# Patient Record
Sex: MALE | Race: OTHER | HISPANIC OR LATINO | Employment: FULL TIME | URBAN - METROPOLITAN AREA
[De-identification: names, ages, dates, MRNs, and addresses within clinical notes are randomized per-mention and may not be internally consistent; named-entity substitution may affect disease eponyms.]

---

## 2018-08-14 ENCOUNTER — HOSPITAL ENCOUNTER (EMERGENCY)
Facility: HOSPITAL | Age: 23
Discharge: HOME | End: 2018-08-14
Attending: EMERGENCY MEDICINE

## 2018-08-14 VITALS
HEART RATE: 79 BPM | OXYGEN SATURATION: 96 % | RESPIRATION RATE: 16 BRPM | WEIGHT: 241.18 LBS | SYSTOLIC BLOOD PRESSURE: 125 MMHG | DIASTOLIC BLOOD PRESSURE: 66 MMHG | TEMPERATURE: 97.6 F

## 2018-08-14 DIAGNOSIS — R19.7 DIARRHEA: ICD-10-CM

## 2018-08-14 DIAGNOSIS — R11.0 NAUSEA: Primary | ICD-10-CM

## 2018-08-14 PROCEDURE — 99283 EMERGENCY DEPT VISIT LOW MDM: CPT

## 2018-08-14 RX ORDER — ONDANSETRON 4 MG/1
TABLET, ORALLY DISINTEGRATING ORAL
Status: COMPLETED
Start: 2018-08-14 | End: 2018-08-14

## 2018-08-14 RX ORDER — ONDANSETRON 4 MG/1
4 TABLET, ORALLY DISINTEGRATING ORAL EVERY 6 HOURS PRN
Qty: 20 TABLET | Refills: 0 | Status: SHIPPED | OUTPATIENT
Start: 2018-08-14

## 2018-08-14 RX ORDER — LOPERAMIDE HYDROCHLORIDE 2 MG/1
CAPSULE ORAL
Status: COMPLETED
Start: 2018-08-14 | End: 2018-08-14

## 2018-08-14 RX ORDER — LOPERAMIDE HYDROCHLORIDE 2 MG/1
2 CAPSULE ORAL ONCE
Status: COMPLETED | OUTPATIENT
Start: 2018-08-14 | End: 2018-08-14

## 2018-08-14 RX ORDER — ONDANSETRON 4 MG/1
4 TABLET, ORALLY DISINTEGRATING ORAL ONCE
Status: COMPLETED | OUTPATIENT
Start: 2018-08-14 | End: 2018-08-14

## 2018-08-14 RX ADMIN — ONDANSETRON 4 MG: 4 TABLET, ORALLY DISINTEGRATING ORAL at 12:33

## 2018-08-14 RX ADMIN — LOPERAMIDE HYDROCHLORIDE 2 MG: 2 CAPSULE ORAL at 12:34

## 2018-08-14 NOTE — ED PROVIDER NOTES
History  Chief Complaint   Patient presents with    Vomiting     "from yesterday I have been throwing up and diarrhea with some abdomen pain"   epigastric pain that is intermittent     56-year-old male with no past medical history presenting with nausea vomiting and diarrhea since yesterday  Patient reports eating any new restaurant and afterwards felt immediately nauseous with 1 episode of vomiting  He states that currently he feels nauseous has not vomited today but has had 2 episodes of diarrhea  He denies any dysuria, hematuria, hemoptysis hematemesis melena or hematochezia  He denies taking anything at home for symptom  He denies any fevers chills or sweats  Patient denies any decreased p o  Intake, has been eating and drinking as normal             None       History reviewed  No pertinent past medical history  Past Surgical History:   Procedure Laterality Date    FOOT SURGERY         History reviewed  No pertinent family history  I have reviewed and agree with the history as documented  Social History   Substance Use Topics    Smoking status: Current Every Day Smoker     Types: E-Cigarettes    Smokeless tobacco: Never Used    Alcohol use Not on file        Review of Systems   All other systems reviewed and are negative  Physical Exam  Physical Exam   Constitutional: He is oriented to person, place, and time  He appears well-developed and well-nourished  No distress  HENT:   Head: Normocephalic and atraumatic  Eyes: Conjunctivae are normal    EOM grossly intact   Neck: Normal range of motion  Neck supple  No JVD present  Cardiovascular: Normal rate  Pulmonary/Chest: Effort normal    Abdominal: Soft  Slight tenderness to the epigastric region with palpation, no right lower quadrant pain with palpation   Musculoskeletal:   FROM, steady gait, cap refill brisk, strength and sensation grossly intact throughout   Neurological: He is alert and oriented to person, place, and time  Skin: Skin is warm and dry  Capillary refill takes less than 2 seconds  Psychiatric: He has a normal mood and affect  His behavior is normal    Nursing note and vitals reviewed  Vital Signs  ED Triage Vitals [08/14/18 1210]   Temperature Pulse Respirations Blood Pressure SpO2   97 6 °F (36 4 °C) 79 16 125/66 96 %      Temp Source Heart Rate Source Patient Position - Orthostatic VS BP Location FiO2 (%)   Temporal Monitor Sitting Left arm --      Pain Score       --           Vitals:    08/14/18 1210   BP: 125/66   Pulse: 79   Patient Position - Orthostatic VS: Sitting       Visual Acuity      ED Medications  Medications   ondansetron (ZOFRAN-ODT) dispersible tablet 4 mg (4 mg Oral Given 8/14/18 1233)   loperamide (IMODIUM) capsule 2 mg (2 mg Oral Given 8/14/18 1234)       Diagnostic Studies  Results Reviewed     None                 No orders to display              Procedures  Procedures       Phone Contacts  ED Phone Contact    ED Course                               MDM  Number of Diagnoses or Management Options  Diarrhea:   Nausea:   Diagnosis management comments: 17-year-old male presenting with nausea vomiting diarrhea after eating at a restaurant, patient's symptoms improved the Zofran and Imodium in the ED, spoke with patient about this likely being viral due to food poisoning, will send home with Zofran for symptomatic treatment, patient to stay hydrated      strict return to ED precautions discussed  Pt verbalizes understanding and agrees with plan  Pt is stable for discharge    Portions of the record may have been created with voice recognition software  Occasional wrong word or "sound a like" substitutions may have occurred due to the inherent limitations of voice recognition software  Read the chart carefully and recognize, using context, where substitutions have occurred          CritCare Time    Disposition  Final diagnoses:   Nausea   Diarrhea     Time reflects when diagnosis was documented in both MDM as applicable and the Disposition within this note     Time User Action Codes Description Comment    8/14/2018  1:07 PM Gertrude Hidalgo Add [R11 0] Nausea     8/14/2018  1:07 PM Theo Tremaine WASHINGTON Add [R19 7] Diarrhea       ED Disposition     ED Disposition Condition Comment    Discharge  Silvestre Flores discharge to home/self care  Condition at discharge: Good        Follow-up Information     Follow up With Specialties Details Why [facility] Medicine Schedule an appointment as soon as possible for a visit in 1 day If symptoms worsen [address] [place] [address]  [address] [phone]          Discharge Medication List as of 8/14/2018  1:08 PM      START taking these medications    Details   ondansetron (ZOFRAN-ODT) 4 mg disintegrating tablet Take 1 tablet (4 mg total) by mouth every 6 (six) hours as needed for nausea or vomiting, Starting Tue 8/14/2018, Print           No discharge procedures on file      ED Provider  Electronically Signed by           Terese Gtuierrez PA-C  08/14/18 3189

## 2018-08-14 NOTE — DISCHARGE INSTRUCTIONS
Náusea y vómito severo, cuidados ambulatorios   INFORMACIÓN GENERAL:   La náusea y vómito severo  empieza de repente, empeora rápidamente y dura un corto periodo de Vickie  La náusea y el vómito pueden ser causados por el embarazo, el alcohol, david infección o medicamentos  Síntomas relacionados comunes incluyen los siguientes:   · Fiebre    · Inflamación abdominal    · Dolor, sensibilidad o un bulto en el abdomen    · Sonidos salpicantes que se escuchan en [place] estómago cuando usted se mueve  Busque cuidados inmediatos para los siguientes síntomas:   · Ashvin en [place] vómito o heces    · Dolor repentino y severo en el pecho y parte superior del abdomen después de kassandra vomitado muy lazaro    · Mareos, sequedad en la boca y sed    · Muy poca orina o ausencia completa de la misma    · Debilidad muscular, calambres musculares y dificultad para respirar    · Latidos cardíacos más rápidos de lo normal    · Vómito por más de 50 horas  El tratamiento para la náusea y el vómito severo  puede incluir medicamentos para calmar [place] estómago y parar el vómito  Es probable que usted necesite de líquidos intravenosos si está deshidratado  Maneje [place] náusea y vómito:   · [place] líquidos según indicaciones, para prevenir la deshidratación  Pregunte cuánto líquido jania [employer] jean pierre y cuáles líquidos son mejores para usted  Es probable que usted necesite jania un líquido de rehidratación oral  Ewelina líquido contiene agua, sales y azúcares que son todos necesarios para reemplazar los líquidos que el cuerpo ha perdido  Pregunte qué tipo de líquidos de rehidratación oral jania, cuánto jania y dónde conseguirlos  · Consuma comidas pequeñas con más frecuencia  Consuma cantidades pequeñas de alimentos cada 2 o 3 horas aunque no sienta hambre  Los alimentos en [place] estómago pueden llegar a [employer]  · Evite el estrés  Busque formas de relajarse y [place] estrés   Los gregorio de alonzo debidos al estrés pueden incluso causar náusea y vómito  Descanse y [employer]  Programe david cathie con love proveedor de kerry según indicaciones:  Anote brad preguntas para que las recuerde na brad citas de seguimiento  ACUERDOS SOBRE LOVE CUIDADO:   Usted tiene el derecho de participar en la planificación de love cuidado  Aprenda todo lo que pueda sobre love condición y iwona darle tratamiento  Discuta con brad médicos brad opciones de tratamiento para juntos decidir el cuidado que usted quiere recibir  Usted siempre tiene el derecho a rechazar love tratamiento  Esta información es sólo para uso en educación  Love intención no es darle un consejo médico sobre enfermedades o tratamientos  Colsulte con love Star Pieter farmacéutico antes de seguir cualquier régimen médico para saber si es seguro y efectivo para usted  © 2014 [address] is for End User's use only and may not be sold, redistributed or otherwise used for commercial purposes  All illustrations and images included in CareNotes® are the copyrighted property of A RENETTA A M , Inc  or Rob Victor

## 2018-08-15 ENCOUNTER — HOSPITAL ENCOUNTER (EMERGENCY)
Facility: HOSPITAL | Age: 23
Discharge: HOME | End: 2018-08-15
Attending: EMERGENCY MEDICINE | Admitting: EMERGENCY MEDICINE

## 2018-08-15 VITALS
OXYGEN SATURATION: 98 % | SYSTOLIC BLOOD PRESSURE: 126 MMHG | TEMPERATURE: 98 F | RESPIRATION RATE: 20 BRPM | DIASTOLIC BLOOD PRESSURE: 64 MMHG | HEART RATE: 76 BPM | WEIGHT: 240.74 LBS

## 2018-08-15 DIAGNOSIS — K52.9 GASTROENTERITIS: Primary | ICD-10-CM

## 2018-08-15 LAB
ALBUMIN SERPL BCP-MCNC: 4.4 G/DL (ref 3–5.2)
ALP SERPL-CCNC: 55 U/L (ref 43–122)
ALT SERPL W P-5'-P-CCNC: 48 U/L (ref 9–52)
ANION GAP SERPL CALCULATED.3IONS-SCNC: 11 MMOL/L (ref 5–14)
AST SERPL W P-5'-P-CCNC: 29 U/L (ref 17–59)
BASOPHILS # BLD AUTO: 0 THOUSANDS/ΜL (ref 0–0.1)
BASOPHILS NFR BLD AUTO: 1 % (ref 0–1)
BILIRUB SERPL-MCNC: 0.5 MG/DL
BILIRUB UR QL STRIP: NEGATIVE
BUN SERPL-MCNC: 8 MG/DL (ref 5–25)
CALCIUM SERPL-MCNC: 9.3 MG/DL (ref 8.4–10.2)
CHLORIDE SERPL-SCNC: 99 MMOL/L (ref 97–108)
CLARITY UR: CLEAR
CO2 SERPL-SCNC: 28 MMOL/L (ref 22–30)
COLOR UR: NORMAL
CREAT SERPL-MCNC: 0.73 MG/DL (ref 0.7–1.5)
EOSINOPHIL # BLD AUTO: 0.1 THOUSAND/ΜL (ref 0–0.4)
EOSINOPHIL NFR BLD AUTO: 2 % (ref 0–6)
ERYTHROCYTE [DISTWIDTH] IN BLOOD BY AUTOMATED COUNT: 13.3 %
GFR SERPL CREATININE-BSD FRML MDRD: 129 ML/MIN/1.73SQ M
GLUCOSE SERPL-MCNC: 83 MG/DL (ref 70–99)
GLUCOSE UR STRIP-MCNC: NEGATIVE MG/DL
HCT VFR BLD AUTO: 43.3 % (ref 41–53)
HGB BLD-MCNC: 14.8 G/DL (ref 13.5–17.5)
HGB UR QL STRIP.AUTO: NEGATIVE
KETONES UR STRIP-MCNC: NEGATIVE MG/DL
LEUKOCYTE ESTERASE UR QL STRIP: NEGATIVE
LIPASE SERPL-CCNC: 28 U/L (ref 23–300)
LYMPHOCYTES # BLD AUTO: 1.6 THOUSANDS/ΜL (ref 0.5–4)
LYMPHOCYTES NFR BLD AUTO: 35 % (ref 20–50)
MCH RBC QN AUTO: 29.1 PG (ref 26–34)
MCHC RBC AUTO-ENTMCNC: 34.1 G/DL (ref 31–36)
MCV RBC AUTO: 85 FL (ref 80–100)
MONOCYTES # BLD AUTO: 0.5 THOUSAND/ΜL (ref 0.2–0.9)
MONOCYTES NFR BLD AUTO: 10 % (ref 1–10)
NEUTROPHILS # BLD AUTO: 2.3 THOUSANDS/ΜL (ref 1.8–7.8)
NEUTS SEG NFR BLD AUTO: 52 % (ref 45–65)
NITRITE UR QL STRIP: NEGATIVE
PH UR STRIP.AUTO: 6 [PH] (ref 4.5–8)
PLATELET # BLD AUTO: 280 THOUSANDS/UL (ref 150–450)
PMV BLD AUTO: 6.7 FL (ref 8.9–12.7)
POTASSIUM SERPL-SCNC: 4 MMOL/L (ref 3.6–5)
PROT SERPL-MCNC: 7.8 G/DL (ref 5.9–8.4)
PROT UR STRIP-MCNC: NEGATIVE MG/DL
RBC # BLD AUTO: 5.09 MILLION/UL (ref 4.5–5.9)
SODIUM SERPL-SCNC: 138 MMOL/L (ref 137–147)
SP GR UR STRIP.AUTO: 1.01 (ref 1–1.04)
UROBILINOGEN UA: NEGATIVE MG/DL
WBC # BLD AUTO: 4.5 THOUSAND/UL (ref 4.5–11)

## 2018-08-15 PROCEDURE — 80053 COMPREHEN METABOLIC PANEL: CPT | Performed by: PHYSICIAN ASSISTANT

## 2018-08-15 PROCEDURE — 36415 COLL VENOUS BLD VENIPUNCTURE: CPT | Performed by: PHYSICIAN ASSISTANT

## 2018-08-15 PROCEDURE — 96374 THER/PROPH/DIAG INJ IV PUSH: CPT

## 2018-08-15 PROCEDURE — 96361 HYDRATE IV INFUSION ADD-ON: CPT

## 2018-08-15 PROCEDURE — 99283 EMERGENCY DEPT VISIT LOW MDM: CPT

## 2018-08-15 PROCEDURE — 83690 ASSAY OF LIPASE: CPT | Performed by: PHYSICIAN ASSISTANT

## 2018-08-15 PROCEDURE — 96375 TX/PRO/DX INJ NEW DRUG ADDON: CPT

## 2018-08-15 PROCEDURE — 81003 URINALYSIS AUTO W/O SCOPE: CPT | Performed by: PHYSICIAN ASSISTANT

## 2018-08-15 PROCEDURE — 85025 COMPLETE CBC W/AUTO DIFF WBC: CPT | Performed by: PHYSICIAN ASSISTANT

## 2018-08-15 RX ORDER — MAGNESIUM HYDROXIDE/ALUMINUM HYDROXICE/SIMETHICONE 120; 1200; 1200 MG/30ML; MG/30ML; MG/30ML
SUSPENSION ORAL
Status: COMPLETED
Start: 2018-08-15 | End: 2018-08-15

## 2018-08-15 RX ORDER — KETOROLAC TROMETHAMINE 30 MG/ML
INJECTION, SOLUTION INTRAMUSCULAR; INTRAVENOUS
Status: COMPLETED
Start: 2018-08-15 | End: 2018-08-15

## 2018-08-15 RX ORDER — MAGNESIUM HYDROXIDE/ALUMINUM HYDROXICE/SIMETHICONE 120; 1200; 1200 MG/30ML; MG/30ML; MG/30ML
30 SUSPENSION ORAL ONCE
Status: COMPLETED | OUTPATIENT
Start: 2018-08-15 | End: 2018-08-15

## 2018-08-15 RX ORDER — SUCRALFATE ORAL 1 G/10ML
1000 SUSPENSION ORAL ONCE
Status: COMPLETED | OUTPATIENT
Start: 2018-08-15 | End: 2018-08-15

## 2018-08-15 RX ORDER — ACETAMINOPHEN 325 MG/1
TABLET ORAL
Status: COMPLETED
Start: 2018-08-15 | End: 2018-08-15

## 2018-08-15 RX ORDER — SUCRALFATE 1 G/1
1 TABLET ORAL 4 TIMES DAILY
Qty: 40 TABLET | Refills: 0 | Status: SHIPPED | OUTPATIENT
Start: 2018-08-15

## 2018-08-15 RX ORDER — ACETAMINOPHEN 325 MG/1
650 TABLET ORAL ONCE
Status: COMPLETED | OUTPATIENT
Start: 2018-08-15 | End: 2018-08-15

## 2018-08-15 RX ORDER — OMEPRAZOLE 20 MG/1
20 CAPSULE, DELAYED RELEASE ORAL DAILY
Qty: 40 CAPSULE | Refills: 0 | Status: SHIPPED | OUTPATIENT
Start: 2018-08-15

## 2018-08-15 RX ORDER — SUCRALFATE ORAL 1 G/10ML
SUSPENSION ORAL
Status: COMPLETED
Start: 2018-08-15 | End: 2018-08-15

## 2018-08-15 RX ORDER — KETOROLAC TROMETHAMINE 30 MG/ML
30 INJECTION, SOLUTION INTRAMUSCULAR; INTRAVENOUS ONCE
Status: COMPLETED | OUTPATIENT
Start: 2018-08-15 | End: 2018-08-15

## 2018-08-15 RX ORDER — SODIUM CHLORIDE 9 MG/ML
250 INJECTION, SOLUTION INTRAVENOUS CONTINUOUS
Status: DISCONTINUED | OUTPATIENT
Start: 2018-08-15 | End: 2018-08-15 | Stop reason: HOSPADM

## 2018-08-15 RX ADMIN — ACETAMINOPHEN 650 MG: 325 TABLET ORAL at 17:18

## 2018-08-15 RX ADMIN — SUCRALFATE 1000 MG: 1 SUSPENSION ORAL at 17:18

## 2018-08-15 RX ADMIN — ALUMINUM HYDROXIDE, MAGNESIUM HYDROXIDE, AND SIMETHICONE 30 ML: 200; 200; 20 SUSPENSION ORAL at 17:18

## 2018-08-15 RX ADMIN — MAGNESIUM HYDROXIDE/ALUMINUM HYDROXICE/SIMETHICONE 30 ML: 120; 1200; 1200 SUSPENSION ORAL at 17:18

## 2018-08-15 RX ADMIN — SUCRALFATE ORAL 1000 MG: 1 SUSPENSION ORAL at 17:18

## 2018-08-15 RX ADMIN — FAMOTIDINE 20 MG: 10 INJECTION, SOLUTION INTRAVENOUS at 16:52

## 2018-08-15 RX ADMIN — SODIUM CHLORIDE 250 ML/HR: 9 INJECTION, SOLUTION INTRAVENOUS at 16:47

## 2018-08-15 RX ADMIN — KETOROLAC TROMETHAMINE 30 MG: 30 INJECTION, SOLUTION INTRAMUSCULAR; INTRAVENOUS at 17:19

## 2018-08-15 NOTE — ED NOTES
Patient was seen here yesterday for vomiting,d iarrhea  Reports vomiting and diarrhea resolved  Patient reports eating grape, crackers and drinking soda today  Reports mild epigastric pain, mild headache  Reports subjective fevers but did not take actual temperature       Molly Tineo RN  08/15/18 4087

## 2018-08-15 NOTE — ED PROVIDER NOTES
History  Chief Complaint   Patient presents with    Vomiting     pt staets taht he was here yesterday for vomiting and diarrhea and is not any better today  pt is now also having abd pain  pt also has a subjective fever and generalized body aches       Medical Problem   Location:  Pt with conitnued nausea and vomiting  Severity:  Mild  Onset quality:  Gradual  Duration:  4 days  Timing:  Constant  Progression:  Unchanged  Chronicity:  New  Associated symptoms: abdominal pain, nausea and vomiting    Associated symptoms: no chest pain, no congestion, no cough, no diarrhea, no ear pain, no fatigue, no fever, no headaches, no loss of consciousness, no myalgias, no rash, no rhinorrhea, no shortness of breath, no sore throat and no wheezing        Prior to Admission Medications   Prescriptions Last Dose Informant Patient Reported? Taking?   ondansetron (ZOFRAN-ODT) 4 mg disintegrating tablet   No No   Sig: Take 1 tablet (4 mg total) by mouth every 6 (six) hours as needed for nausea or vomiting      Facility-Administered Medications: None       History reviewed  No pertinent past medical history  Past Surgical History:   Procedure Laterality Date    FOOT SURGERY         History reviewed  No pertinent family history  I have reviewed and agree with the history as documented  Social History   Substance Use Topics    Smoking status: Current Every Day Smoker     Types: E-Cigarettes    Smokeless tobacco: Never Used    Alcohol use No        Review of Systems   Constitutional: Negative  Negative for fatigue and fever  HENT: Negative  Negative for congestion, ear pain, rhinorrhea and sore throat  Eyes: Negative  Respiratory: Negative  Negative for cough, shortness of breath and wheezing  Cardiovascular: Negative  Negative for chest pain  Gastrointestinal: Positive for abdominal pain, nausea and vomiting  Negative for diarrhea  Endocrine: Negative  Genitourinary: Negative      Musculoskeletal: Negative  Negative for myalgias  Skin: Negative  Negative for rash  Allergic/Immunologic: Negative  Neurological: Negative  Negative for loss of consciousness and headaches  Hematological: Negative  Psychiatric/Behavioral: Negative  All other systems reviewed and are negative  Physical Exam  Physical Exam   Constitutional: He appears well-developed and well-nourished  550p,  Pt feeling better  With lab work being normal will hold off on ct scan  Pt agrees  Will return if conditin worsens for further testing   HENT:   Head: Normocephalic and atraumatic  Right Ear: External ear normal    Left Ear: External ear normal    Nose: Nose normal    Mouth/Throat: Oropharynx is clear and moist    Eyes: Conjunctivae and EOM are normal  Pupils are equal, round, and reactive to light  Neck: Normal range of motion  Neck supple  Cardiovascular: Normal rate, regular rhythm, normal heart sounds and intact distal pulses  Pulmonary/Chest: Effort normal and breath sounds normal    Abdominal: Soft  Bowel sounds are normal    Musculoskeletal: Normal range of motion  Neurological: He is alert  Skin: Skin is warm  Psychiatric: He has a normal mood and affect  His behavior is normal  Judgment and thought content normal    Nursing note and vitals reviewed        Vital Signs  ED Triage Vitals   Temperature Pulse Respirations Blood Pressure SpO2   08/15/18 1618 08/15/18 1618 08/15/18 1756 08/15/18 1618 08/15/18 1618   98 °F (36 7 °C) 94 20 133/65 96 %      Temp Source Heart Rate Source Patient Position - Orthostatic VS BP Location FiO2 (%)   08/15/18 1618 08/15/18 1618 08/15/18 1618 08/15/18 1618 --   Temporal Monitor Sitting Left arm       Pain Score       08/15/18 1649       6           Vitals:    08/15/18 1618 08/15/18 1756   BP: 133/65 126/64   Pulse: 94 76   Patient Position - Orthostatic VS: Sitting        Visual Acuity      ED Medications  Medications   famotidine (PEPCID) injection 20 mg (20 mg Intravenous Given 8/15/18 1652)   aluminum-magnesium hydroxide-simethicone (MYLANTA) 200-200-20 mg/5 mL oral suspension 30 mL (30 mL Oral Given 8/15/18 1718)   sucralfate (CARAFATE) oral suspension 1,000 mg (1,000 mg Oral Given 8/15/18 1718)   acetaminophen (TYLENOL) tablet 650 mg (650 mg Oral Given 8/15/18 1718)   ketorolac (TORADOL) injection 30 mg (30 mg Intravenous Given 8/15/18 1719)       Diagnostic Studies  Results Reviewed     Procedure Component Value Units Date/Time    Lipase [[id number]]  (Normal) Collected:  08/15/18 1644    Lab Status:  Final result Specimen:  Blood from Arm, Right Updated:  08/15/18 1703     Lipase 28 u/L     Comprehensive metabolic panel [[id number]]  (Normal) Collected:  08/15/18 1644    Lab Status:  Final result Specimen:  Blood from Arm, Right Updated:  08/15/18 1702     Sodium 138 mmol/L      Potassium 4 0 mmol/L      Chloride 99 mmol/L      CO2 28 mmol/L      Anion Gap 11 mmol/L      BUN 8 mg/dL      Creatinine 0 73 mg/dL      Glucose 83 mg/dL      Calcium 9 3 mg/dL      AST 29 U/L      ALT 48 U/L      Alkaline Phosphatase 55 U/L      Total Protein 7 8 g/dL      Albumin 4 4 g/dL      Total Bilirubin 0 50 mg/dL      eGFR 129 ml/min/1 73sq m     Narrative:         National Kidney Disease Education Program recommendations are as follows:  GFR calculation is accurate only with a steady state creatinine  Chronic Kidney disease less than 60 ml/min/1 73 sq  meters  Kidney failure less than 15 ml/min/1 73 sq  meters      UA w Reflex to Microscopic w Reflex to Culture [[id number]]  (Normal) Collected:  08/15/18 1651    Lab Status:  Final result Specimen:  Urine from Urine, Clean Catch Updated:  08/15/18 1702     Color, UA Straw     Clarity, UA Clear     Specific Gravity, UA 1 010     pH, UA 6 0     Leukocytes, UA Negative     Nitrite, UA Negative     Protein, UA Negative mg/dl      Glucose, UA Negative mg/dl      Ketones, UA Negative mg/dl      Bilirubin, UA Negative     Blood, UA Negative UROBILINOGEN UA Negative mg/dL     CBC and differential [[id number]]  (Abnormal) Collected:  08/15/18 1644    Lab Status:  Final result Specimen:  Blood from Arm, Right Updated:  08/15/18 1656     WBC 4 50 Thousand/uL      RBC 5 09 Million/uL      Hemoglobin 14 8 g/dL      Hematocrit 43 3 %      MCV 85 fL      MCH 29 1 pg      MCHC 34 1 g/dL      RDW 13 3 %      MPV 6 7 (L) fL      Platelets 714 Thousands/uL      Neutrophils Relative 52 %      Lymphocytes Relative 35 %      Monocytes Relative 10 %      Eosinophils Relative 2 %      Basophils Relative 1 %      Neutrophils Absolute 2 30 Thousands/µL      Lymphocytes Absolute 1 60 Thousands/µL      Monocytes Absolute 0 50 Thousand/µL      Eosinophils Absolute 0 10 Thousand/µL      Basophils Absolute 0 00 Thousands/µL                  No orders to display              Procedures  Procedures       Phone Contacts  ED Phone Contact    ED Course                               MDM  CritCare Time    Disposition  Final diagnoses:   Gastroenteritis     Time reflects when diagnosis was documented in both MDM as applicable and the Disposition within this note     Time User Action Codes Description Comment    8/15/2018  5:49 PM David Chirinos, [address] [K52 9] Gastroenteritis       ED Disposition     ED Disposition Condition Comment    Discharge  Bernabe Wilson discharge to home/self care      Condition at discharge: Good        Follow-up Information     Follow up With Specialties Details Why [address]   [address], [address] [facility] [address] [phone]          Discharge Medication List as of 8/15/2018  5:52 PM      START taking these medications    Details   omeprazole (PriLOSEC) 20 mg delayed release capsule Take 1 capsule (20 mg total) by mouth daily, Starting Wed 8/15/2018, Print      sucralfate (CARAFATE) 1 g tablet Take 1 tablet (1 g total) by mouth 4 (four) times a day, Starting Wed 8/15/2018, Print         CONTINUE these medications which have NOT CHANGED    Details   ondansetron (ZOFRAN-ODT) 4 mg disintegrating tablet Take 1 tablet (4 mg total) by mouth every 6 (six) hours as needed for nausea or vomiting, Starting Tue 8/14/2018, Print           No discharge procedures on file      ED Provider  Electronically Signed by           Jalyn Santos PA-C  08/15/18 1748

## 2018-08-15 NOTE — DISCHARGE INSTRUCTIONS
Gastroenteritis   LO QUE NECESITA SABER:   La gastroenteritis, o gripe estomacal, es david infección del estómago y los intestinos  INSTRUCCIONES SOBRE [facility]:   Llame al 911 en florecita de presentar lo siguiente:   · Usted tiene dificultad para respirar o pulso acelerado  Regrese a la chen de emergencias si:   · Usted ve fred en [place] diarrea  · Usted no puede dejar de vomitar  · Usted no ha orinado en 12 horas  · Usted siente que se va a desmayar  Pregúntele a [place] vitaminas y minerales son adecuados para usted  · Usted tiene fiebre  · Usted continúa con vómitos o diarrea aún después del tratamiento  · Usted ve lombrices en [place] diarrea  · [place] boca u ojos están secos  Usted no está orinando tanto o con la misma frecuencia  · Usted tiene preguntas o inquietudes acerca de [place] condición o cuidado  Medicamentos:   · Medicamentos,  se pueden administrar para [employer] vómitos o la diarrea, disminuir los calambres abdominales o tratar david infección  · [place] brad medicamentos iwona se le haya indicado  Consulte con [place] médico si usted stephanie que [place] medicamento no le está ayudando o si presenta efectos secundarios  Infórmele si es alérgico a cualquier medicamento  Mantenga david lista actualizada de los [place], las vitaminas y los productos herbales que nolan  Incluya los siguientes datos de los medicamentos: cantidad, frecuencia y motivo de administración  Traiga con usted la lista o los envases de la píldoras a brad citas de seguimiento  Lleve la lista de los medicamentos con usted en florecita de david emergencia  [place] de [place] síntomas:   · [place] líquidos iwona se le haya indicado  Pregunte a [place] médico qué cantidad de líquido debe beber a diario y qué líquidos le recomienda  Es posible que también necesite jania david solución de rehidratación oral (SRO)   David SRO contiene la cantidad [place] de azúcar, sal y minerales en agua para reponer los líquidos corporales  · Consuma alimentos blandos  Cuando usted sienta [place], empiece a comer alimentos suaves y blandos  Ejemplos son los plátanos, sopas claras, fracisco y puré de [place]  No consuma productos lácteos, alcohol, bebidas azucaradas, o bebidas con cafeína hasta que se sienta mejor  · Descanse el mayor tiempo posible  Cuando se empiece a sentir mejor, comience poco a poco a hacer más cada día  Evite la propagación de la gastroenteritis:  La gastroenteritis se puede propagar fácilmente  Manténgase usted, [place] robert y brad alrededores limpios para ayudar a evitar la propagación de la gastroenteritis:  · Lávese las mina frecuentemente  Utilice agua y Liam  [employer] las mina después de usar el baño, cambiarle el pañal a un chantale o estornudar  Lávese las mina antes de comer o preparar alimentos  · Limpie las superficies y lave la ropa con frecuencia  Lave [place] ropa y brad toallas por separado del harpreet de la ropa  Limpie las superficies de [place] hogar con limpiador antibacterial o con blanqueador  · Lave y cocine michael los alimentos  Lave las verduras crudas antes de cocinar  [address] y [place]  No utilice los mismos platos para las vishal crudas que para otros alimentos  Ponga en el refrigerador inmediatamente cualquier alimento que haya sobrado  · Esté alerta cuando usted vaya de campamento o cuando viaje  Solamente tome agua limpia  No tome agua de los mark o lauro a menos que usted purifique o hierva el agua vikas  Cuando viaje, tome agua embotellada y no le ponga hielo  No coma fruta con la cáscara  No coma pescado crudo o vishal que no están cocinadas completamente  Acuda a brad consultas de control con [place] médico según le indicaron  Anote brad preguntas para que se acuerde de hacerlas na brad visitas  © 2017 [address] Information is for End User's use only and may not be sold, redistributed or otherwise used for commercial purposes   All illustrations and images included in CareNotes® are the copyrighted property of A D A M , Inc  or Rob Victor  Esta información es sólo para uso en educación  [place] intención no es darle un consejo médico sobre enfermedades o tratamientos  Colsulte con [place] [employer] farmacéutico antes de seguir cualquier régimen médico para saber si es seguro y efectivo para usted

## 2018-12-17 ENCOUNTER — HOSPITAL ENCOUNTER (EMERGENCY)
Facility: HOSPITAL | Age: 23
Discharge: HOME | End: 2018-12-17
Attending: EMERGENCY MEDICINE | Admitting: EMERGENCY MEDICINE
Payer: OTHER MISCELLANEOUS

## 2018-12-17 VITALS
HEART RATE: 80 BPM | WEIGHT: 231 LBS | OXYGEN SATURATION: 99 % | SYSTOLIC BLOOD PRESSURE: 119 MMHG | TEMPERATURE: 96.5 F | DIASTOLIC BLOOD PRESSURE: 70 MMHG | RESPIRATION RATE: 16 BRPM

## 2018-12-17 DIAGNOSIS — T14.8XXA MUSCLE STRAIN: Primary | ICD-10-CM

## 2018-12-17 PROCEDURE — 99283 EMERGENCY DEPT VISIT LOW MDM: CPT

## 2018-12-17 RX ORDER — NAPROXEN 500 MG/1
500 TABLET ORAL 2 TIMES DAILY WITH MEALS
Qty: 10 TABLET | Refills: 0 | Status: SHIPPED | OUTPATIENT
Start: 2018-12-17 | End: 2019-12-17

## 2018-12-17 RX ORDER — BACLOFEN 10 MG/1
10 TABLET ORAL 3 TIMES DAILY
Qty: 12 TABLET | Refills: 0 | Status: SHIPPED | OUTPATIENT
Start: 2018-12-17 | End: 2018-12-21

## 2018-12-17 NOTE — DISCHARGE INSTRUCTIONS
Distensión muscular   LO QUE NECESITA SABER:   David distensión muscular es david torcedura, tirón o desgarre de un músculo o tendón  Un tendón es un tejido elástico lazaro que conecta un músculo a un hueso  Los signos de un músculo distendido incluyen moretones e inflamación en el área, dolor con el movimiento y pérdida de la fuerza:        INSTRUCCIONES SOBRE EL [facility]ITAL[facility]:   Regrese a la chen de emergencias si:   · Usted repentinamente no siente o no puede  el músculo lesionado  Pregúntele a [place] Christian Katayama vitaminas y minerales son adecuados para usted  · [place] dolor o inflamación empeoran o no desaparecen  · Usted tiene preguntas o inquietudes acerca de [place] condición o cuidado  Medicamentos:   · AINEs (Analgésicos antiinflamatorios no esteroides)  pueden disminuir la inflamación y el dolor o la fiebre  Kylah medicamento esta disponible con o sin david receta médica  Los AINEs pueden causar sangrado estomacal o problemas renales en ciertas personas  Si usted nolan un medicamento anticoagulante, siempre pregúntele a [place] médico si los DIOR son seguros para usted  Siempre janee la etiqueta de kylah medicamento y [place] instrucciones  · Relajantes musculares  ayudan a reducir dolor y espasmos musculares  · Rye brad medicamentos iwona se le haya indicado  Consulte con [place] médico si usted stephanie que [place] medicamento no le está ayudando o si presenta efectos secundarios  Infórmele si es alérgico a algún medicamento  Mantenga david lista actualizada de los [place], las vitaminas y los productos herbales que nolan  Incluya los siguientes datos de los medicamentos: cantidad, frecuencia y motivo de administración  Traiga con usted la lista o los envases de la píldoras a brad citas de seguimiento  Lleve la lista de los medicamentos con usted en florecita de david emergencia  Acuda a brad consultas de control con [place] médico según le indicaron    [place] médico podría sugerirle que programe david cathie antes de regresar a brad actividades normales  Anote brad preguntas para que se acuerde de hacerlas na brad visitas  Cuidados personales:   · De 3 a 7 días después de la lesión:  Bc Herson, Compresión y Elevación [facility] para ayudar a detener los moretones y disminuir el dolor y la inflamación  ¨ Descanse:  Descanse el músculo para permitir que la lesión sane  Cuando disminuya el dolor, comience a realizar movimientos normales y lentos  Para distensiones musculares leves y moderadas, usted debe descansar los músculos por lo menos 2 días  Sin embargo, si usted tiene david distensión muscular severa, el descanso debe ser de 10 a 14 jean pierre  Es posible que usted necesite muletas para caminar si la distensión muscular está en las piernas o en la parte inferior del cuerpo  ¨ Hielo:  Coloque david bolsa de hielo en el área lesionada  Coloque david toalla entre el paquete de hielo y la piel  No coloque la bolsa de hielo directamente sobre la piel  Usted puede usar un paquete de chícharos congelados en vez de usar david bolsa de hielo  ¨ Compresión:  Es posible que usted necesite envolver un vendaje elástico alrededor del área para reducir la inflamación  Estas deben estar lo suficientemente apretadas iwona para sentir sostén  No lo apriete demasiado  ¨ Elevación:  Mantenga el músculo lesionado arriba del nivel del corazón si es posible  Por ejemplo, si usted tiene [employer] parte inferior de la pierna, acuéstese y apoye la pierna sobre almohadas  [place] ayuda a disminuir el dolor y la inflamación  · De 3 a 21 días después de la lesión:  Comience lenta y regularmente a ejercitar el músculo con la distensión   [place] ayudará a que sane  Si siente dolor, disminuya la fuerza del ejercicio  · De 1 a 6 semanas después de la lesión:  Estire el músculo lesionado  Estírelo alrededor de 30 segundos  Marielle esto 4 veces al día  Usted puede estirar el músculo hasta que sienta un jalón pequeño   Suspenda el estiramiento del músculo si siente dolor  · De 2 semanas a 6 meses después de la lesión:  La meta de esta fase es que usted regrese a la actividad que estaba haciendo antes que ocurriera la lesión sin lastimar el músculo  · De 3 semanas a 6 meses después de la lesión:  Continúe estirando y fortaleciendo brad músculos para evitar david Crystal Hirschfeld  Aumente el tiempo y la distancia del ejercicio lentamente  Usted podría tener signos y síntomas de distensión muscular 6 meses después de la lesión, incluso si usted realiza actividades para ayudarla a sanar  En [employer], es posible que usted necesite cirugía en el músculo  © 2017 [address] Information is for End User's use only and may not be sold, redistributed or otherwise used for commercial purposes  All illustrations and images included in CareNotes® are the copyrighted property of A D A M , Inc  or Rob Victor  Esta información es sólo para uso en educación  Stoll intención no es darle un consejo médico sobre enfermedades o tratamientos  Colsulte con stoll Deion Pinta farmacéutico antes de seguir cualquier régimen médico para saber si es seguro y efectivo para usted

## 2019-05-22 ENCOUNTER — HOSPITAL ENCOUNTER (EMERGENCY)
Facility: HOSPITAL | Age: 24
Discharge: HOME | End: 2019-05-22
Attending: EMERGENCY MEDICINE | Admitting: EMERGENCY MEDICINE

## 2019-05-22 VITALS
WEIGHT: 220.46 LBS | RESPIRATION RATE: 16 BRPM | HEART RATE: 75 BPM | SYSTOLIC BLOOD PRESSURE: 152 MMHG | TEMPERATURE: 98.1 F | OXYGEN SATURATION: 98 % | DIASTOLIC BLOOD PRESSURE: 86 MMHG

## 2019-05-22 DIAGNOSIS — J06.9 VIRAL URI: Primary | ICD-10-CM

## 2019-05-22 PROCEDURE — 99282 EMERGENCY DEPT VISIT SF MDM: CPT | Performed by: PHYSICIAN ASSISTANT

## 2019-05-22 PROCEDURE — 99283 EMERGENCY DEPT VISIT LOW MDM: CPT

## 2020-09-10 ENCOUNTER — HOSPITAL ENCOUNTER (EMERGENCY)
Facility: HOSPITAL | Age: 25
Discharge: HOME | End: 2020-09-10
Attending: EMERGENCY MEDICINE | Admitting: EMERGENCY MEDICINE
Payer: MEDICAID

## 2020-09-10 VITALS
SYSTOLIC BLOOD PRESSURE: 130 MMHG | TEMPERATURE: 98.4 F | WEIGHT: 213.85 LBS | HEART RATE: 76 BPM | DIASTOLIC BLOOD PRESSURE: 71 MMHG | RESPIRATION RATE: 16 BRPM | OXYGEN SATURATION: 99 %

## 2020-09-10 DIAGNOSIS — R10.13 EPIGASTRIC PAIN: ICD-10-CM

## 2020-09-10 DIAGNOSIS — R11.2 NAUSEA AND VOMITING, INTRACTABILITY OF VOMITING NOT SPECIFIED, UNSPECIFIED VOMITING TYPE: ICD-10-CM

## 2020-09-10 DIAGNOSIS — K29.00 ACUTE GASTRITIS, PRESENCE OF BLEEDING UNSPECIFIED, UNSPECIFIED GASTRITIS TYPE: Primary | ICD-10-CM

## 2020-09-10 PROCEDURE — 99283 EMERGENCY DEPT VISIT LOW MDM: CPT

## 2020-09-10 PROCEDURE — 99284 EMERGENCY DEPT VISIT MOD MDM: CPT | Performed by: NURSE PRACTITIONER

## 2020-09-10 RX ORDER — MAGNESIUM HYDROXIDE/ALUMINUM HYDROXICE/SIMETHICONE 120; 1200; 1200 MG/30ML; MG/30ML; MG/30ML
30 SUSPENSION ORAL ONCE
Status: COMPLETED | OUTPATIENT
Start: 2020-09-10 | End: 2020-09-10

## 2020-09-10 RX ORDER — OMEPRAZOLE 20 MG/1
20 CAPSULE, DELAYED RELEASE ORAL DAILY
Qty: 30 CAPSULE | Refills: 0 | Status: SHIPPED | OUTPATIENT
Start: 2020-09-10

## 2020-09-10 RX ORDER — FAMOTIDINE 20 MG/1
20 TABLET, FILM COATED ORAL ONCE
Status: COMPLETED | OUTPATIENT
Start: 2020-09-10 | End: 2020-09-10

## 2020-09-10 RX ORDER — LIDOCAINE HYDROCHLORIDE 20 MG/ML
15 SOLUTION OROPHARYNGEAL ONCE
Status: COMPLETED | OUTPATIENT
Start: 2020-09-10 | End: 2020-09-10

## 2020-09-10 RX ORDER — ONDANSETRON 4 MG/1
4 TABLET, FILM COATED ORAL EVERY 8 HOURS PRN
Qty: 20 TABLET | Refills: 0 | Status: SHIPPED | OUTPATIENT
Start: 2020-09-10

## 2020-09-10 RX ORDER — ONDANSETRON 4 MG/1
4 TABLET, ORALLY DISINTEGRATING ORAL ONCE
Status: COMPLETED | OUTPATIENT
Start: 2020-09-10 | End: 2020-09-10

## 2020-09-10 RX ADMIN — FAMOTIDINE 20 MG: 20 TABLET ORAL at 06:09

## 2020-09-10 RX ADMIN — ONDANSETRON 4 MG: 4 TABLET, ORALLY DISINTEGRATING ORAL at 06:11

## 2020-09-10 RX ADMIN — LIDOCAINE HYDROCHLORIDE 15 ML: 20 SOLUTION ORAL; TOPICAL at 06:10

## 2020-09-10 RX ADMIN — ALUMINUM HYDROXIDE, MAGNESIUM HYDROXIDE, AND SIMETHICONE 30 ML: 200; 200; 20 SUSPENSION ORAL at 06:10

## 2020-09-10 NOTE — DISCHARGE INSTRUCTIONS
Take the omeprazole as prescribed  Avoid spicy foods, fried foods, caffeine, soda, alcohol  Follow up with your PCP  Take the zofran for any vomiting

## 2020-09-10 NOTE — ED PROVIDER NOTES
History  Chief Complaint   Patient presents with    Abdominal Pain     Pt reports "acid reflux and vomitting that began at 1am"     This is a 30year old male who states has a PMH of gastritis and developed epigastric pain with vomiting around 1am  He states he moved here from [place] and has no PCP and is not taking his "white" pill - he does not know the name of it  He states he took an acid reducer this am w/o relief  History provided by:  Patient and medical records   used: No    Abdominal Pain   Pain location:  Epigastric  Onset quality:  Sudden  Progression:  Unchanged  Chronicity:  Recurrent  Relieved by:  Nothing  Ineffective treatments:  OTC medications  Associated symptoms: vomiting        Prior to Admission Medications   Prescriptions Last Dose Informant Patient Reported? Taking?   baclofen 10 mg tablet   No No   Sig: Take 1 tablet (10 mg total) by mouth 3 (three) times a day for 4 days   naproxen (EC NAPROSYN) 500 MG EC tablet   No No   Sig: Take 1 tablet (500 mg total) by mouth 2 (two) times a day with meals   Patient not taking: Reported on 5/22/2019   omeprazole (PriLOSEC) 20 mg delayed release capsule   No No   Sig: Take 1 capsule (20 mg total) by mouth daily   Patient not taking: Reported on 5/22/2019   ondansetron (ZOFRAN-ODT) 4 mg disintegrating tablet   No No   Sig: Take 1 tablet (4 mg total) by mouth every 6 (six) hours as needed for nausea or vomiting   Patient not taking: Reported on 5/22/2019   sucralfate (CARAFATE) 1 g tablet   No No   Sig: Take 1 tablet (1 g total) by mouth 4 (four) times a day   Patient not taking: Reported on 5/22/2019      Facility-Administered Medications: None       No past medical history on file  Past Surgical History:   Procedure Laterality Date    FOOT SURGERY         No family history on file  I have reviewed and agree with the history as documented      E-Cigarette/Vaping    E-Cigarette Use Never User      E-Cigarette/Vaping Substances     Social History     Tobacco Use    Smoking status: Current Every Day Smoker     Types: E-Cigarettes    Smokeless tobacco: Never Used   Substance Use Topics    Alcohol use: No    Drug use: Yes     Types: Marijuana     Comment: occasionaly        Review of Systems   Gastrointestinal: Positive for abdominal pain and vomiting  All other systems reviewed and are negative  Physical Exam  Physical Exam  Vitals signs and nursing note reviewed  Constitutional:       General: He is not in acute distress  Appearance: He is well-developed and normal weight  He is not ill-appearing, toxic-appearing or diaphoretic  HENT:      Head: Normocephalic and atraumatic  Cardiovascular:      Rate and Rhythm: Normal rate and regular rhythm  Pulmonary:      Effort: Pulmonary effort is normal       Breath sounds: Normal breath sounds  Abdominal:      Palpations: Abdomen is soft  Tenderness: There is abdominal tenderness in the epigastric area  Skin:     General: Skin is warm and dry  Capillary Refill: Capillary refill takes less than 2 seconds  Neurological:      General: No focal deficit present  Mental Status: He is alert and oriented to person, place, and time     Psychiatric:         Mood and Affect: Mood normal          Behavior: Behavior normal          Vital Signs  ED Triage Vitals [09/10/20 0554]   Temperature Pulse Respirations Blood Pressure SpO2   98 4 °F (36 9 °C) 76 16 130/71 99 %      Temp src Heart Rate Source Patient Position - Orthostatic VS BP Location FiO2 (%)   -- -- -- -- --      Pain Score       9           Vitals:    09/10/20 0554   BP: 130/71   Pulse: 76         Visual Acuity      ED Medications  Medications   ondansetron (ZOFRAN-ODT) dispersible tablet 4 mg (4 mg Oral Given 9/10/20 0611)   famotidine (PEPCID) tablet 20 mg (20 mg Oral Given 9/10/20 0609)   aluminum-magnesium hydroxide-simethicone (MYLANTA) 200-200-20 mg/5 mL oral suspension 30 mL (30 mL Oral Given 9/10/20 0610)   Lidocaine Viscous HCl (XYLOCAINE) 2 % mucosal solution 15 mL (15 mL Swish & Swallow Given 9/10/20 2111)       Diagnostic Studies  Results Reviewed     None                 No orders to display              Procedures  Procedures         ED Course  ED Course as of Sep 10 0635   [facility] Sep 10, 2020   9655 Pt states he is feeling better  He verbalizes understanding of dc isntructions and follow up  Will place on protonix for gastritis/gerd and zofran for nausea  Pt to follow up with PCP and or GI  SBIRT 20yo+      Most Recent Value   SBIRT (24 yo +)   In order to provide better care to our patients, we are screening all of our patients for alcohol and drug use  Would it be okay to ask you these screening questions? Yes Filed at: 09/10/2020 0557   Initial Alcohol Screen: US AUDIT-C    1  How often do you have a drink containing alcohol?  0 Filed at: 09/10/2020 0557   2  How many drinks containing alcohol do you have on a typical day you are drinking? 0 Filed at: 09/10/2020 0557   3a  Male UNDER 65: How often do you have five or more drinks on one occasion? 0 Filed at: 09/10/2020 0557   Audit-C Score  0 Filed at: 09/10/2020 3476   PHILLIP: How many times in the past year have you    Used an illegal drug or used a prescription medication for non-medical reasons?   Never Filed at: 09/10/2020 0557                  MDM  Number of Diagnoses or Management Options  Diagnosis management comments: Epigastric pain and vomiting  Hx of gastritis    DDX:  Gastritis  GERD  Doubt pancreatitis, bowel obstruction    Plan  GI cocktail  pepcid  zofran  Reassess        Amount and/or Complexity of Data Reviewed  Review and summarize past medical records: yes        Disposition  Final diagnoses:   Epigastric pain   Nausea and vomiting, intractability of vomiting not specified, unspecified vomiting type   Acute gastritis, presence of bleeding unspecified, unspecified gastritis type     Time reflects when diagnosis was documented in both MDM as applicable and the Disposition within this note     Time User Action Codes Description Comment    9/10/2020  6:12 AM Shannan Grills Add [R10 13] Epigastric pain     9/10/2020  6:12 AM Shannan Grills Add [R11 2] Nausea and vomiting, intractability of vomiting not specified, unspecified vomiting type     9/10/2020  6:19 AM Dayton Grills Add [K29 00] Acute gastritis, presence of bleeding unspecified, unspecified gastritis type     9/10/2020  6:19 AM Zimmerman Look [R10 13] Epigastric pain     9/10/2020  6:19 AM Dayton Grills Modify [K29 00] Acute gastritis, presence of bleeding unspecified, unspecified gastritis type       ED Disposition     ED Disposition Condition Date/Time Comment    Discharge Stable Thu Sep 10, 2020  6:32 AM Voncile Closs OrtizMorales discharge to home/self care  Follow-up Information     Follow up With Specialties Details Why Contact Info Additional [address] [facility] Emergency Department Emergency Medicine  If symptoms worsen Gustavo [id number] [phone] [place] [facility], [address], [place], [address]          Patient's Medications   Discharge Prescriptions    OMEPRAZOLE (PRILOSEC) 20 MG DELAYED RELEASE CAPSULE    Take 1 capsule (20 mg total) by mouth daily       Start Date: 9/10/2020 End Date: --       Order Dose: 20 mg       Quantity: 30 capsule    Refills: 0    ONDANSETRON (ZOFRAN) 4 MG TABLET    Take 1 tablet (4 mg total) by mouth every 8 (eight) hours as needed for nausea or vomiting       Start Date: 9/10/2020 End Date: --       Order Dose: 4 mg       Quantity: 20 tablet    Refills: 0     No discharge procedures on file      PDMP Review     None          ED Provider  Electronically Signed by           Rylie Esparza  09/10/20 9416

## 2020-09-10 NOTE — Clinical Note
Farideh Ross was seen and treated in our emergency department on 9/10/2020  Diagnosis:     Han Epperson  may return to work on return date  He may return on this date: 09/11/2020         If you have any questions or concerns, please don't hesitate to call        HUA Esposito    ______________________________           _______________          _______________  Hospital Representative                              Date                                Time

## 2023-02-26 ENCOUNTER — HOSPITAL ENCOUNTER (EMERGENCY)
Facility: HOSPITAL | Age: 28
Discharge: HOME | End: 2023-02-26
Attending: EMERGENCY MEDICINE

## 2023-02-26 VITALS
SYSTOLIC BLOOD PRESSURE: 120 MMHG | TEMPERATURE: 98.1 F | HEART RATE: 60 BPM | OXYGEN SATURATION: 100 % | DIASTOLIC BLOOD PRESSURE: 67 MMHG | RESPIRATION RATE: 18 BRPM | WEIGHT: 190.04 LBS

## 2023-02-26 DIAGNOSIS — R10.13 ACUTE EPIGASTRIC PAIN: Primary | ICD-10-CM

## 2023-02-26 DIAGNOSIS — R11.2 NAUSEA AND VOMITING: ICD-10-CM

## 2023-02-26 LAB
ALBUMIN SERPL BCP-MCNC: 4.4 G/DL (ref 3.5–5)
ALP SERPL-CCNC: 34 U/L (ref 34–104)
ALT SERPL W P-5'-P-CCNC: 14 U/L (ref 7–52)
ANION GAP SERPL CALCULATED.3IONS-SCNC: 3 MMOL/L (ref 4–13)
AST SERPL W P-5'-P-CCNC: 19 U/L (ref 13–39)
BASOPHILS # BLD AUTO: 0.02 THOUSANDS/ÂΜL (ref 0–0.1)
BASOPHILS NFR BLD AUTO: 0 % (ref 0–1)
BILIRUB SERPL-MCNC: 0.38 MG/DL (ref 0.2–1)
BUN SERPL-MCNC: 15 MG/DL (ref 5–25)
CALCIUM SERPL-MCNC: 9.4 MG/DL (ref 8.4–10.2)
CHLORIDE SERPL-SCNC: 105 MMOL/L (ref 96–108)
CO2 SERPL-SCNC: 30 MMOL/L (ref 21–32)
CREAT SERPL-MCNC: 0.66 MG/DL (ref 0.6–1.3)
EOSINOPHIL # BLD AUTO: 0.12 THOUSAND/ÂΜL (ref 0–0.61)
EOSINOPHIL NFR BLD AUTO: 2 % (ref 0–6)
ERYTHROCYTE [DISTWIDTH] IN BLOOD BY AUTOMATED COUNT: 13.1 % (ref 11.6–15.1)
GFR SERPL CREATININE-BSD FRML MDRD: 130 ML/MIN/1.73SQ M
GLUCOSE SERPL-MCNC: 80 MG/DL (ref 65–140)
HCT VFR BLD AUTO: 43.5 % (ref 36.5–49.3)
HGB BLD-MCNC: 14.4 G/DL (ref 12–17)
IMM GRANULOCYTES # BLD AUTO: 0.03 THOUSAND/UL (ref 0–0.2)
IMM GRANULOCYTES NFR BLD AUTO: 1 % (ref 0–2)
LIPASE SERPL-CCNC: 17 U/L (ref 11–82)
LYMPHOCYTES # BLD AUTO: 1.68 THOUSANDS/ÂΜL (ref 0.6–4.47)
LYMPHOCYTES NFR BLD AUTO: 25 % (ref 14–44)
MCH RBC QN AUTO: 30.4 PG (ref 26.8–34.3)
MCHC RBC AUTO-ENTMCNC: 33.1 G/DL (ref 31.4–37.4)
MCV RBC AUTO: 92 FL (ref 82–98)
MONOCYTES # BLD AUTO: 0.61 THOUSAND/ÂΜL (ref 0.17–1.22)
MONOCYTES NFR BLD AUTO: 9 % (ref 4–12)
NEUTROPHILS # BLD AUTO: 4.16 THOUSANDS/ÂΜL (ref 1.85–7.62)
NEUTS SEG NFR BLD AUTO: 63 % (ref 43–75)
NRBC BLD AUTO-RTO: 0 /100 WBCS
PLATELET # BLD AUTO: 268 THOUSANDS/UL (ref 149–390)
PMV BLD AUTO: 8.8 FL (ref 8.9–12.7)
POTASSIUM SERPL-SCNC: 5 MMOL/L (ref 3.5–5.3)
PROT SERPL-MCNC: 6.7 G/DL (ref 6.4–8.4)
RBC # BLD AUTO: 4.73 MILLION/UL (ref 3.88–5.62)
SODIUM SERPL-SCNC: 138 MMOL/L (ref 135–147)
WBC # BLD AUTO: 6.62 THOUSAND/UL (ref 4.31–10.16)

## 2023-02-26 RX ORDER — SUCRALFATE 1 G/1
1 TABLET ORAL 4 TIMES DAILY
Qty: 20 TABLET | Refills: 0 | Status: SHIPPED | OUTPATIENT
Start: 2023-02-26

## 2023-02-26 RX ORDER — ONDANSETRON 2 MG/ML
4 INJECTION INTRAMUSCULAR; INTRAVENOUS ONCE
Status: COMPLETED | OUTPATIENT
Start: 2023-02-26 | End: 2023-02-26

## 2023-02-26 RX ORDER — MAGNESIUM HYDROXIDE/ALUMINUM HYDROXICE/SIMETHICONE 120; 1200; 1200 MG/30ML; MG/30ML; MG/30ML
30 SUSPENSION ORAL ONCE
Status: COMPLETED | OUTPATIENT
Start: 2023-02-26 | End: 2023-02-26

## 2023-02-26 RX ORDER — ONDANSETRON 4 MG/1
4 TABLET, ORALLY DISINTEGRATING ORAL EVERY 6 HOURS PRN
Qty: 20 TABLET | Refills: 0 | Status: SHIPPED | OUTPATIENT
Start: 2023-02-26

## 2023-02-26 RX ADMIN — ONDANSETRON HYDROCHLORIDE 4 MG: 2 SOLUTION INTRAMUSCULAR; INTRAVENOUS at 06:51

## 2023-02-26 RX ADMIN — ALUMINUM HYDROXIDE, MAGNESIUM HYDROXIDE, AND SIMETHICONE 30 ML: 200; 200; 20 SUSPENSION ORAL at 07:10

## 2023-02-26 RX ADMIN — SODIUM CHLORIDE 1000 ML: 0.9 INJECTION, SOLUTION INTRAVENOUS at 06:55

## 2023-02-26 NOTE — ED PROVIDER NOTES
History  Chief Complaint   Patient presents with   • Gas     C/o gas and epigastric pain  Also c/o some nausea  32 y o  M p/w epigastric pain x 2 days  History provided by:  Patient   used: No    Abdominal Pain  Pain location:  Epigastric  Pain quality: burning    Pain radiates to:  Does not radiate  Duration:  2 days  Progression:  Unchanged  Chronicity:  New  Context: not recent travel and not sick contacts    Associated symptoms: belching, nausea and vomiting (x1)    Associated symptoms: no constipation, no diarrhea and no fever        Prior to Admission Medications   Prescriptions Last Dose Informant Patient Reported? Taking?   omeprazole (PriLOSEC) 20 mg delayed release capsule Not Taking  No No   Sig: Take 1 capsule (20 mg total) by mouth daily   Patient not taking: Reported on 5/22/2019   omeprazole (PriLOSEC) 20 mg delayed release capsule Not Taking  No No   Sig: Take 1 capsule (20 mg total) by mouth daily   Patient not taking: Reported on 2/26/2023   ondansetron (ZOFRAN) 4 mg tablet Not Taking  No No   Sig: Take 1 tablet (4 mg total) by mouth every 8 (eight) hours as needed for nausea or vomiting   Patient not taking: Reported on 2/26/2023   ondansetron (ZOFRAN-ODT) 4 mg disintegrating tablet Not Taking  No No   Sig: Take 1 tablet (4 mg total) by mouth every 6 (six) hours as needed for nausea or vomiting   Patient not taking: Reported on 5/22/2019   sucralfate (CARAFATE) 1 g tablet Not Taking  No No   Sig: Take 1 tablet (1 g total) by mouth 4 (four) times a day   Patient not taking: Reported on 5/22/2019      Facility-Administered Medications: None       History reviewed  No pertinent past medical history  Past Surgical History:   Procedure Laterality Date   • FOOT SURGERY         History reviewed  No pertinent family history  I have reviewed and agree with the history as documented      E-Cigarette/Vaping   • E-Cigarette Use Never User      E-Cigarette/Vaping Substances Social History     Tobacco Use   • Smoking status: Every Day     Types: E-Cigarettes   • Smokeless tobacco: Never   Vaping Use   • Vaping Use: Never used   Substance Use Topics   • Alcohol use: No   • Drug use: Yes     Types: Marijuana     Comment: occasionaly        Review of Systems   Constitutional: Negative for fever  Gastrointestinal: Positive for abdominal pain, nausea and vomiting (x1)  Negative for blood in stool, constipation and diarrhea  Musculoskeletal: Negative for myalgias  Neurological: Negative for headaches  All other systems reviewed and are negative  Physical Exam  Physical Exam  Vitals and nursing note reviewed  Constitutional:       General: He is not in acute distress  Appearance: Normal appearance  He is well-developed  He is not ill-appearing, toxic-appearing or diaphoretic  HENT:      Head: Normocephalic and atraumatic  Eyes:      General: No scleral icterus  Neck:      Vascular: No JVD  Trachea: Trachea normal    Cardiovascular:      Rate and Rhythm: Normal rate and regular rhythm  Heart sounds: Normal heart sounds  No murmur heard  No friction rub  Pulmonary:      Effort: Pulmonary effort is normal  No accessory muscle usage or respiratory distress  Breath sounds: Normal breath sounds  No stridor  No wheezing, rhonchi or rales  Abdominal:      General: There is no distension  Palpations: Abdomen is soft  Abdomen is not rigid  There is no mass  Tenderness: There is no abdominal tenderness  There is no guarding or rebound  Negative signs include Mcbride's sign and McBurney's sign  Musculoskeletal:      Cervical back: Normal range of motion  Skin:     General: Skin is warm and dry  Coloration: Skin is not pale  Findings: No rash  Neurological:      Mental Status: He is alert  GCS: GCS eye subscore is 4  GCS verbal subscore is 5  GCS motor subscore is 6     Psychiatric:         Behavior: Behavior normal  Vital Signs  ED Triage Vitals [02/26/23 0617]   Temperature Pulse Respirations Blood Pressure SpO2   98 1 °F (36 7 °C) 69 17 123/70 100 %      Temp Source Heart Rate Source Patient Position - Orthostatic VS BP Location FiO2 (%)   Oral Monitor Sitting Right arm --      Pain Score       4           Vitals:    02/26/23 0617 02/26/23 0713   BP: 123/70 120/67   Pulse: 69 62   Patient Position - Orthostatic VS: Sitting Lying         Visual Acuity      ED Medications  Medications   ondansetron (ZOFRAN) injection 4 mg (4 mg Intravenous Given 2/26/23 0651)   sodium chloride 0 9 % bolus 1,000 mL (1,000 mL Intravenous New Bag 2/26/23 0655)   aluminum-magnesium hydroxide-simethicone (MYLANTA) oral suspension 30 mL (30 mL Oral Given 2/26/23 0710)       Diagnostic Studies  Results Reviewed     Procedure Component Value Units Date/Time    Lipase [[id number]]  (Normal) Collected: 02/26/23 0650    Lab Status: Final result Specimen: Blood from Arm, Right Updated: 02/26/23 0754     Lipase 17 u/L     Comprehensive metabolic panel [[id number]]  (Abnormal) Collected: 02/26/23 0650    Lab Status: Final result Specimen: Blood from Arm, Right Updated: 02/26/23 0730     Sodium 138 mmol/L      Potassium 5 0 mmol/L      Chloride 105 mmol/L      CO2 30 mmol/L      ANION GAP 3 mmol/L      BUN 15 mg/dL      Creatinine 0 66 mg/dL      Glucose 80 mg/dL      Calcium 9 4 mg/dL      AST 19 U/L      ALT 14 U/L      Alkaline Phosphatase 34 U/L      Total Protein 6 7 g/dL      Albumin 4 4 g/dL      Total Bilirubin 0 38 mg/dL      eGFR 130 ml/min/1 73sq m     Narrative:      [place] guidelines for Chronic Kidney Disease (CKD):   •  Stage 1 with normal or high GFR (GFR > 90 mL/min/1 73 square meters)  •  Stage 2 Mild CKD (GFR = 60-89 mL/min/1 73 square meters)  •  Stage 3A Moderate CKD (GFR = 45-59 mL/min/1 73 square meters)  •  Stage 3B Moderate CKD (GFR = 30-44 mL/min/1 73 square meters)  •  Stage 4 Severe CKD (GFR = 15-29 mL/min/1 73 square meters)  •  Stage 5 End Stage CKD (GFR <15 mL/min/1 73 square meters)  Note: GFR calculation is accurate only with a steady state creatinine    CBC and differential [[id number]]  (Abnormal) Collected: 02/26/23 0650    Lab Status: Final result Specimen: Blood from Arm, Right Updated: 02/26/23 0701     WBC 6 62 Thousand/uL      RBC 4 73 Million/uL      Hemoglobin 14 4 g/dL      Hematocrit 43 5 %      MCV 92 fL      MCH 30 4 pg      MCHC 33 1 g/dL      RDW 13 1 %      MPV 8 8 fL      Platelets 774 Thousands/uL      nRBC 0 /100 WBCs      Neutrophils Relative 63 %      Immat GRANS % 1 %      Lymphocytes Relative 25 %      Monocytes Relative 9 %      Eosinophils Relative 2 %      Basophils Relative 0 %      Neutrophils Absolute 4 16 Thousands/µL      Immature Grans Absolute 0 03 Thousand/uL      Lymphocytes Absolute 1 68 Thousands/µL      Monocytes Absolute 0 61 Thousand/µL      Eosinophils Absolute 0 12 Thousand/µL      Basophils Absolute 0 02 Thousands/µL                  No orders to display              Procedures  Procedures         ED Course  ED Course as of 02/26/23 0811   Sun Feb 26, 2023   0701 WBC: 6 62  Normal   0807 Pt reports feeling better  Updated pt on labs  Instructed him to f/u with PCP  SBIRT 20yo+    Flowsheet Row Most Recent Value   SBIRT (23 yo +)    In order to provide better care to our patients, we are screening all of our patients for alcohol and drug use  Would it be okay to ask you these screening questions?  No Filed at: 02/26/2023 0714                    MDM    Disposition  Final diagnoses:   Acute epigastric pain   Nausea and vomiting     Time reflects when diagnosis was documented in both MDM as applicable and the Disposition within this note     Time User Action Codes Description Comment    2/26/2023  7:31 AM Prabha Rubalcava Add [R10 13] Acute epigastric pain     2/26/2023  7:31 AM Prabha Rubalcava Add [R11 2] Nausea and vomiting       ED Disposition     ED Disposition   Discharge    Condition   Stable    Date/Time   Sun Feb 26, 2023  8:08 AM    Comment   Servando Hinojosa discharge to home/self care  Follow-up Information     Follow up With Specialties Details Why Contact Info    Your family doctor  Schedule an appointment as soon as possible for a visit             Patient's Medications   Discharge Prescriptions    ONDANSETRON (ZOFRAN ODT) 4 MG DISINTEGRATING TABLET    Take 1 tablet (4 mg total) by mouth every 6 (six) hours as needed for nausea or vomiting       Start Date: 2/26/2023 End Date: --       Order Dose: 4 mg       Quantity: 20 tablet    Refills: 0    SUCRALFATE (CARAFATE) 1 G TABLET    Take 1 tablet (1 g total) by mouth 4 (four) times a day       Start Date: 2/26/2023 End Date: --       Order Dose: 1 g       Quantity: 20 tablet    Refills: 0       No discharge procedures on file      PDMP Review     None          ED Provider  Electronically Signed by           [address], DO  02/26/23 9661

## 2023-06-21 ENCOUNTER — APPOINTMENT (EMERGENCY)
Dept: ULTRASOUND IMAGING | Facility: HOSPITAL | Age: 28
End: 2023-06-21

## 2023-06-21 ENCOUNTER — HOSPITAL ENCOUNTER (EMERGENCY)
Facility: HOSPITAL | Age: 28
Discharge: HOME | End: 2023-06-21
Attending: EMERGENCY MEDICINE | Admitting: EMERGENCY MEDICINE

## 2023-06-21 VITALS
TEMPERATURE: 97.8 F | HEART RATE: 77 BPM | SYSTOLIC BLOOD PRESSURE: 157 MMHG | OXYGEN SATURATION: 98 % | WEIGHT: 191.6 LBS | RESPIRATION RATE: 18 BRPM | DIASTOLIC BLOOD PRESSURE: 83 MMHG

## 2023-06-21 DIAGNOSIS — N45.1 EPIDIDYMITIS: Primary | ICD-10-CM

## 2023-06-21 LAB
BACTERIA UR QL AUTO: ABNORMAL /HPF
BILIRUB UR QL STRIP: NEGATIVE
CLARITY UR: CLEAR
COLOR UR: YELLOW
GLUCOSE UR STRIP-MCNC: NEGATIVE MG/DL
HGB UR QL STRIP.AUTO: NEGATIVE
KETONES UR STRIP-MCNC: NEGATIVE MG/DL
LEUKOCYTE ESTERASE UR QL STRIP: 25
NITRITE UR QL STRIP: NEGATIVE
NON-SQ EPI CELLS URNS QL MICRO: ABNORMAL /HPF
PH UR STRIP.AUTO: 7 [PH]
PROT UR STRIP-MCNC: NEGATIVE MG/DL
RBC #/AREA URNS AUTO: ABNORMAL /HPF
SP GR UR STRIP.AUTO: 1.01 (ref 1–1.04)
UROBILINOGEN UA: NEGATIVE MG/DL
WBC #/AREA URNS AUTO: ABNORMAL /HPF

## 2023-06-21 PROCEDURE — 81001 URINALYSIS AUTO W/SCOPE: CPT

## 2023-06-21 PROCEDURE — 96372 THER/PROPH/DIAG INJ SC/IM: CPT

## 2023-06-21 PROCEDURE — 76870 US EXAM SCROTUM: CPT

## 2023-06-21 PROCEDURE — 87591 N.GONORRHOEAE DNA AMP PROB: CPT

## 2023-06-21 PROCEDURE — 81003 URINALYSIS AUTO W/O SCOPE: CPT

## 2023-06-21 PROCEDURE — 99284 EMERGENCY DEPT VISIT MOD MDM: CPT

## 2023-06-21 PROCEDURE — 87491 CHLMYD TRACH DNA AMP PROBE: CPT

## 2023-06-21 RX ORDER — IBUPROFEN 600 MG/1
600 TABLET ORAL ONCE
Status: COMPLETED | OUTPATIENT
Start: 2023-06-21 | End: 2023-06-21

## 2023-06-21 RX ORDER — IBUPROFEN 600 MG/1
600 TABLET ORAL EVERY 6 HOURS PRN
Qty: 30 TABLET | Refills: 0 | Status: SHIPPED | OUTPATIENT
Start: 2023-06-21

## 2023-06-21 RX ORDER — DOXYCYCLINE HYCLATE 100 MG/1
100 CAPSULE ORAL 2 TIMES DAILY
Qty: 20 CAPSULE | Refills: 0 | Status: SHIPPED | OUTPATIENT
Start: 2023-06-21 | End: 2023-07-01

## 2023-06-21 RX ORDER — DOXYCYCLINE HYCLATE 100 MG/1
100 CAPSULE ORAL ONCE
Status: COMPLETED | OUTPATIENT
Start: 2023-06-21 | End: 2023-06-21

## 2023-06-21 RX ADMIN — IBUPROFEN 600 MG: 600 TABLET, FILM COATED ORAL at 12:24

## 2023-06-21 RX ADMIN — LIDOCAINE HYDROCHLORIDE 250 MG: 10 INJECTION, SOLUTION EPIDURAL; INFILTRATION; INTRACAUDAL; PERINEURAL at 14:05

## 2023-06-21 RX ADMIN — DOXYCYCLINE 100 MG: 100 CAPSULE ORAL at 14:05

## 2023-06-21 NOTE — ED ATTENDING ATTESTATION
6/21/2023  ITeodoro MD, saw and evaluated the patient  I have discussed the patient with the resident/non-physician practitioner and agree with the resident's/non-physician practitioner's findings, Plan of Care, and MDM as documented in the resident's/non-physician practitioner's note, except where noted  All available labs and Radiology studies were reviewed  I was present for key portions of any procedure(s) performed by the resident/non-physician practitioner and I was immediately available to provide assistance  At this point I agree with the current assessment done in the Emergency Department  I have conducted an independent evaluation of this patient a history and physical is as follows:    75-year-old male presenting for evaluation of left testicular pain and swelling that started several days ago  Denies trauma or injury to the area  Denies associated fever, chills, chest pain, shortness of breath, nausea, vomiting, abdominal pain, flank pain, hematuria, dysuria, penile lesions or discharge  Denies recent sexual activity  Patient does not concern for gonorrhea or chlamydia  Did not take anything for pain prior to arrival     Please see resident documentation for histories and review of systems      Exam: Vital signs and nursing note reviewed; examined patient with Dr Theodore Amador: Awake, alert, no acute distress  HEENT: Normocephalic, atraumatic, mucous membranes moist  Neck: Supple  Heart: Regular rate  Lungs: Nonlabored, symmetric expansion  Abdomen: Soft, nontender, nondistended  : Left testicle is erythematous and tender to palpation posteriorly, no penile lesions or discharge, right testicle is normal  Extremity: No deformity  Skin: Warm, dry, intact, no rash  Neuro: No focal logic deficit  Psych: Anxious, cooperative    ED Course  ED Course as of 06/21/23 1407   Wed Jun 21, 2023   1354 US scrotum and testicles  Left epididymitis     ED course/medical decision m[age]-year-old male presenting for evaluation of left testicular pain and swelling  Differential diagnosis includes testicular torsion, epididymitis, varicocele, hydrocele, hernia, referred pain from nephrolithiasis or intra-abdominal process  Physical examination is concerning for epididymitis, but testicular torsion is also a consideration  No abdominal pain or tenderness  Scrotal ultrasound was obtained in addition to urinalysis and gonorrhea and Chlamydia testing  Ultrasound is consistent with left epididymitis  No evidence of decreased blood flow or torsion  Will initiate antibiotic therapy covering for gonorrhea and chlamydia, as these are the most common pathogens in this patient's age group and he is sexually active  Advised on rest, ice, NSAIDs, and scrotal support for symptomatic treatment  He will follow-up with urology and primary care provider  Return precautions discussed  Patient is in agreement and understanding of these instructions      Diagnosis: Left epididymitis  Disposition: Discharge

## 2023-06-21 NOTE — DISCHARGE INSTRUCTIONS
Take ibuprofen as needed for pain  Take the antibiotics as prescribed twice daily for the next 10 days  Call and schedule a follow-up appointment with the urology team   Return to the emergency department for any worsening pain, fevers or chills, difficulty urinating, or any other concerning signs or symptoms

## 2023-06-21 NOTE — Clinical Note
Donn Bagley was seen and treated in our emergency department on 6/21/2023  Diagnosis:     Kilo Stark  is off the rest of the shift today  He may return on this date: If you have any questions or concerns, please don't hesitate to call        Artem Parsons, DO    ______________________________           _______________          _______________  Hospital Representative                              Date                                Time

## 2023-06-21 NOTE — ED PROVIDER NOTES
"History  Chief Complaint   Patient presents with   • Testicle Pain     Patient states he has had L testicle pain and swelling since Saturday  States \" feels different\"  No known injury  No pta medications      Niraj Day is a 25year old male presenting for evaluation of 5 days of left testicle swelling and discomfort  He describes that he first noticed the described symptoms when he was showering and cleaning his genitals  He described mild tenderness to palpation  Tenderness is worse when standing and ambulating  He denied any dysuria or hematuria  No fevers or chills  No penile discharge, no recent sexual activity  No recent injuries or blows to the testicles  No abdominal tenderness  He does not have any history of prior testicular pathology  History provided by:  Patient   used: No    Testicle Pain  Associated symptoms: no abdominal pain, no chest pain, no cough, no ear pain, no fever, no rash, no shortness of breath, no sore throat and no vomiting        Prior to Admission Medications   Prescriptions Last Dose Informant Patient Reported?  Taking?   omeprazole (PriLOSEC) 20 mg delayed release capsule   No No   Sig: Take 1 capsule (20 mg total) by mouth daily   Patient not taking: Reported on 5/22/2019   omeprazole (PriLOSEC) 20 mg delayed release capsule   No No   Sig: Take 1 capsule (20 mg total) by mouth daily   Patient not taking: Reported on 2/26/2023   ondansetron (ZOFRAN) 4 mg tablet   No No   Sig: Take 1 tablet (4 mg total) by mouth every 8 (eight) hours as needed for nausea or vomiting   Patient not taking: Reported on 2/26/2023   ondansetron (ZOFRAN-ODT) 4 mg disintegrating tablet   No No   Sig: Take 1 tablet (4 mg total) by mouth every 6 (six) hours as needed for nausea or vomiting   Patient not taking: Reported on 5/22/2019   ondansetron (Zofran ODT) 4 mg disintegrating tablet   No No   Sig: Take 1 tablet (4 mg total) by mouth every 6 (six) hours as needed for nausea or " vomiting   sucralfate (CARAFATE) 1 g tablet   No No   Sig: Take 1 tablet (1 g total) by mouth 4 (four) times a day   Patient not taking: Reported on 5/22/2019   sucralfate (CARAFATE) 1 g tablet   No No   Sig: Take 1 tablet (1 g total) by mouth 4 (four) times a day      Facility-Administered Medications: None       History reviewed  No pertinent past medical history  Past Surgical History:   Procedure Laterality Date   • FOOT SURGERY         History reviewed  No pertinent family history  I have reviewed and agree with the history as documented  E-Cigarette/Vaping   • E-Cigarette Use Current Every Day User      E-Cigarette/Vaping Substances   • Nicotine Yes      Social History     Tobacco Use   • Smoking status: Former     Types: E-Cigarettes   • Smokeless tobacco: Never   Vaping Use   • Vaping Use: Every day   • Substances: Nicotine   Substance Use Topics   • Alcohol use: No   • Drug use: Yes     Types: Marijuana     Comment: occasionaly         Review of Systems   Constitutional: Negative for chills and fever  HENT: Negative for ear pain and sore throat  Eyes: Negative for pain and visual disturbance  Respiratory: Negative for cough and shortness of breath  Cardiovascular: Negative for chest pain and palpitations  Gastrointestinal: Negative for abdominal pain and vomiting  Genitourinary: Positive for scrotal swelling and testicular pain  Negative for dysuria and hematuria  Musculoskeletal: Negative for arthralgias and back pain  Skin: Negative for color change and rash  Neurological: Negative for seizures and syncope  All other systems reviewed and are negative        Physical Exam  ED Triage Vitals   Temperature Pulse Respirations Blood Pressure SpO2   06/21/23 1203 06/21/23 1203 06/21/23 1203 06/21/23 1203 06/21/23 1203   97 8 °F (36 6 °C) 77 18 157/83 98 %      Temp Source Heart Rate Source Patient Position - Orthostatic VS BP Location FiO2 (%)   06/21/23 1203 06/21/23 1203 06/21/23 1203 06/21/23 1203 --   Tympanic Monitor Sitting Left arm       Pain Score       06/21/23 1224       5             Orthostatic Vital Signs  Vitals:    06/21/23 1203   BP: 157/83   Pulse: 77   Patient Position - Orthostatic VS: Sitting       Physical Exam  Vitals and nursing note reviewed  Constitutional:       General: He is not in acute distress  Appearance: Normal appearance  HENT:      Head: Normocephalic and atraumatic  Right Ear: External ear normal       Left Ear: External ear normal       Mouth/Throat:      Mouth: Mucous membranes are moist       Pharynx: Oropharynx is clear  Eyes:      General: No scleral icterus  Conjunctiva/sclera: Conjunctivae normal    Cardiovascular:      Rate and Rhythm: Normal rate and regular rhythm  Pulses: Normal pulses  Heart sounds: Normal heart sounds  Pulmonary:      Effort: Pulmonary effort is normal  No respiratory distress  Breath sounds: Normal breath sounds  Abdominal:      General: Abdomen is flat  There is no distension  Palpations: Abdomen is soft  Tenderness: There is no abdominal tenderness  Genitourinary:     Testes:         Right: Mass or tenderness not present  Left: Tenderness present  Mass, swelling, testicular hydrocele or varicocele not present  Epididymis:      Right: Normal       Left: Tenderness present  Comments: I did not appreciate any swelling of the testicles or scrotum, he did appear to have tenderness to palpation of the testicle but more specifically the epididymitis  Musculoskeletal:         General: No tenderness or signs of injury  Cervical back: Neck supple  No rigidity  Skin:     General: Skin is warm  Coloration: Skin is not jaundiced  Findings: No erythema or rash  Neurological:      General: No focal deficit present  Mental Status: He is alert  Mental status is at baseline     Psychiatric:         Mood and Affect: Mood normal          Behavior: Behavior normal          ED Medications  Medications   cefTRIAXone (ROCEPHIN) 250 mg in lidocaine (PF) (XYLOCAINE-MPF) 1 % IM only syringe (has no administration in time range)   doxycycline hyclate (VIBRAMYCIN) capsule 100 mg (has no administration in time range)   ibuprofen (MOTRIN) tablet 600 mg (600 mg Oral Given 6/21/23 1224)       Diagnostic Studies  Results Reviewed     Procedure Component Value Units Date/Time    Chlamydia/GC amplified DNA by PCR [[id number]] Collected: 06/21/23 1355    Lab Status: In process Specimen: Urine, Other Updated: 06/21/23 1358    UA w Reflex to Microscopic w Reflex to Culture [[id number]] Collected: 06/21/23 1355    Lab Status: In process Specimen: Urine, Clean Catch Updated: 06/21/23 1358                 US scrotum and testicles   Final Result by Kiesha Boo DO (06/21 1351)      1  Enlarged and hyperemic left epididymis, most compatible with left epididymitis  The study was marked in [facility] for immediate notification  Workstation performed: [id number]               Procedures  Procedures      ED Course                             SBIRT 20yo+    Flowsheet Row Most Recent Value   Initial Alcohol Screen: US AUDIT-C     1  How often do you have a drink containing alcohol? 0 Filed at: 06/21/2023 1207   2  How many drinks containing alcohol do you have on a typical day you are drinking? 0 Filed at: 06/21/2023 1207   3a  Male UNDER 65: How often do you have five or more drinks on one occasion? 0 Filed at: 06/21/2023 1207   3b  FEMALE Any Age, or MALE 65+: How often do you have 4 or more drinks on one occassion? 0 Filed at: 06/21/2023 1207   Audit-C Score 0 Filed at: 06/21/2023 1207   PHILLIP: How many times in the past year have you    Used an illegal drug or used a prescription medication for non-medical reasons? Once or Twice Filed at: 06/21/2023 1207   DAST-10: In the past 12 months       1  Have you used drugs other than those required for medical reasons?  1 Filed at: 06/21/2023 "6027   2  Do you use more than one drug at a time? 0 Filed at: 06/21/2023 1207   3  Have you had medical problems as a result of your drug use (e g , memory loss, hepatitis, convulsions, bleeding, etc )? 0 Filed at: 06/21/2023 1207   4  Have you had \"blackouts\" or \"flashbacks\" as a result of drug use? YesNo 0 Filed at: 06/21/2023 1207   5  Do you ever feel bad or guilty about your drug use? 0 Filed at: 06/21/2023 1207   6  Does your spouse (or parent) ever complain about your involvement with drugs? 0 Filed at: 06/21/2023 1207   7  Have you neglected your family because of your use of drugs? 0 Filed at: 06/21/2023 1207   8  Have you engaged in illegal activities in order to obtain drugs? 0 Filed at: 06/21/2023 1207   9  Have you ever experienced withdrawal symptoms (felt sick) when you stopped taking drugs? 0 Filed at: 06/21/2023 1207   10  Are you always able to stop using drugs when you want to? 0 Filed at: 06/21/2023 1207   DAST-10 Score 1 Filed at: 06/21/2023 1207                Tgharryweromario Soliz is a 25year old male presenting for evaluation of 5 days of left testicle swelling and discomfort  He denies any fevers or chills, no penile discharge  No dysuria or hematuria  No history of prior testicular pathology  Patient did not have any abdominal tenderness on examination, he was not in any significant distress  Patient did have tenderness to palpation of the left testicle, tenderness was most severe around the epididymis  I did not appreciate any significant scrotal or testicular swelling  Given these findings, concern for orchitis versus epididymitis, lower suspicion for testicular torsion  Ultrasound showed evidence of left epididymitis  UA was collected and pending, GC/chlamydia also pending  Given his age group and considering likely source of infection, patient treated with IM Rocephin and doxycycline for coverage for possible gonorrhea/chlamydia infection    The prescription for " doxycycline was sent to his pharmacy, a referral was made to urology and I advised him to follow-up with them, I advised that he take ibuprofen as needed for pain, I gave strict return precautions, he was understanding and agreeable to plan  Epididymitis: acute illness or injury  Amount and/or Complexity of Data Reviewed  Labs: ordered  Radiology: ordered  Risk  Prescription drug management  Disposition  Final diagnoses:   Epididymitis     Time reflects when diagnosis was documented in both MDM as applicable and the Disposition within this note     Time User Action Codes Description Comment    6/21/2023  1:56 PM Artem Shah Add [N45 1] Epididymitis       ED Disposition     ED Disposition   Discharge    Condition   Stable    Date/Time   Wed Jun 21, 2023  1:56 PM    [facility] discharge to home/self care  Follow-up Information     Follow up With Specialties Details Why Contact Info Additional [facility] Urology [facility] Urology Schedule an appointment as soon as possible for a visit   [facility] [phone]  [address] [place] [address] Urology [facility], Madhuri Romano [address], Markus Haney [address]          Patient's Medications   Discharge Prescriptions    DOXYCYCLINE HYCLATE (VIBRAMYCIN) 100 MG CAPSULE    Take 1 capsule (100 mg total) by mouth 2 (two) times a day for 10 days       Start Date: 6/21/2023 End Date: 7/1/2023       Order Dose: 100 mg       Quantity: 20 capsule    Refills: 0    IBUPROFEN (MOTRIN) 600 MG TABLET    Take 1 tablet (600 mg total) by mouth every 6 (six) hours as needed for mild pain       Start Date: 6/21/2023 End Date: --       Order Dose: 600 mg       Quantity: 30 tablet    Refills: 0         PDMP Review     None           ED Provider  Attending physically available and evaluated Guille Situ   I managed the patient along with the ED Attending      Electronically Signed by         Danita Ramos DO  06/21/23 0086

## 2023-06-22 LAB
C TRACH DNA SPEC QL NAA+PROBE: POSITIVE
N GONORRHOEA DNA SPEC QL NAA+PROBE: NEGATIVE

## 2024-05-18 ENCOUNTER — HOSPITAL ENCOUNTER (EMERGENCY)
Facility: HOSPITAL | Age: 29
Discharge: HOME | End: 2024-05-18
Attending: EMERGENCY MEDICINE

## 2024-05-18 VITALS
RESPIRATION RATE: 17 BRPM | DIASTOLIC BLOOD PRESSURE: 60 MMHG | SYSTOLIC BLOOD PRESSURE: 135 MMHG | WEIGHT: 209.88 LBS | TEMPERATURE: 98 F | OXYGEN SATURATION: 100 % | HEART RATE: 84 BPM

## 2024-05-18 DIAGNOSIS — M54.50 ACUTE LOW BACK PAIN: Primary | ICD-10-CM

## 2024-05-18 PROCEDURE — 99284 EMERGENCY DEPT VISIT MOD MDM: CPT | Performed by: EMERGENCY MEDICINE

## 2024-05-18 PROCEDURE — 99282 EMERGENCY DEPT VISIT SF MDM: CPT

## 2024-05-18 PROCEDURE — 96372 THER/PROPH/DIAG INJ SC/IM: CPT

## 2024-05-18 RX ORDER — IBUPROFEN 600 MG/1
600 TABLET ORAL EVERY 6 HOURS PRN
Qty: 30 TABLET | Refills: 0 | Status: SHIPPED | OUTPATIENT
Start: 2024-05-18

## 2024-05-18 RX ORDER — LIDOCAINE 50 MG/G
1 PATCH TOPICAL ONCE
Status: DISCONTINUED | OUTPATIENT
Start: 2024-05-18 | End: 2024-05-18 | Stop reason: HOSPADM

## 2024-05-18 RX ORDER — METHOCARBAMOL 750 MG/1
750 TABLET, FILM COATED ORAL 3 TIMES DAILY PRN
Qty: 30 TABLET | Refills: 0 | Status: SHIPPED | OUTPATIENT
Start: 2024-05-18

## 2024-05-18 RX ORDER — KETOROLAC TROMETHAMINE 30 MG/ML
30 INJECTION, SOLUTION INTRAMUSCULAR; INTRAVENOUS ONCE
Status: COMPLETED | OUTPATIENT
Start: 2024-05-18 | End: 2024-05-18

## 2024-05-18 RX ADMIN — KETOROLAC TROMETHAMINE 30 MG: 30 INJECTION, SOLUTION INTRAMUSCULAR at 11:19

## 2024-05-18 RX ADMIN — LIDOCAINE 1 PATCH: 50 PATCH TOPICAL at 11:19

## 2024-05-18 NOTE — DISCHARGE INSTRUCTIONS
Puede alternar paracetamol 1000 mg e ibuprofeno 600 mg cada 3 horas para el dolor. Intente programar [place] ibuprofeno para poder tomarlo cuando coma. Además, puede aplicar hielo na 15 a 20 minutos cada 1 a 2 horas mientras está despierto para controlar aún más el dolor. Puede utilizar parches de lidocaína de venta deshawn iwona se indica en el paquete.    Le lin recetado un relajante muscular, metocarbamol, para aliviar aún más los síntomas.  You can alternate acetaminophen 1000mg and ibuprofen 600mg every 3 hours for pain.  Try to time your ibuprofen so you can take it when you eat.  Additionally you can apply ice for 15-20 minutes every 1-2 hours while awake for additional pain control.  You can use over the counter lidocaine patches as directed on the packaging.    You have been given a prescription for a muscle relaxer, methocarbamol, for additional symptom relief.

## 2024-05-18 NOTE — Clinical Note
Grover SuttoncameronRuben was seen and treated in our emergency department on 5/18/2024.                Diagnosis:     Grover  may return to work on return date.    He may return on this date: 05/21/2024         If you have any questions or concerns, please don't hesitate to call.      Lilia Hoskins, DO    ______________________________           _______________          _______________  Hospital Representative                              Date                                Time

## 2024-05-18 NOTE — ED PROVIDER NOTES
History  Chief Complaint   Patient presents with    Back Pain     Right lower back pain, worse when ambulating, started thursday     29y M here for evaluation of left sided low back pain. Started on Thursday when he was getting dressesd - had a brief sharp, stabbing pain in the left low back region. Able to go to work.  Woke Friday w/ increased pain and unable to work.  Took someone else's  muscle relaxer today w/o improvement.  No hx of chronic low back pain - has had intermittent mild issues, but nothing as severe as this.    C/o pain to the left lower back w/o sig radiation. Pain worse w/ movement/position changes.  Denies f/c/s, no numbness or weakness, no b/b incont, no previous surgeries/injuries to the back, no hx of cancer, no ivda. No recent falls or injuries.  No other complaints.      History provided by:  Patient   used: No        Prior to Admission Medications   Prescriptions Last Dose Informant Patient Reported? Taking?   ibuprofen (MOTRIN) 600 mg tablet   No No   Sig: Take 1 tablet (600 mg total) by mouth every 6 (six) hours as needed for mild pain   omeprazole (PriLOSEC) 20 mg delayed release capsule   No No   Sig: Take 1 capsule (20 mg total) by mouth daily   Patient not taking: Reported on 5/22/2019   omeprazole (PriLOSEC) 20 mg delayed release capsule   No No   Sig: Take 1 capsule (20 mg total) by mouth daily   Patient not taking: Reported on 2/26/2023   ondansetron (ZOFRAN) 4 mg tablet   No No   Sig: Take 1 tablet (4 mg total) by mouth every 8 (eight) hours as needed for nausea or vomiting   Patient not taking: Reported on 2/26/2023   ondansetron (ZOFRAN-ODT) 4 mg disintegrating tablet   No No   Sig: Take 1 tablet (4 mg total) by mouth every 6 (six) hours as needed for nausea or vomiting   Patient not taking: Reported on 5/22/2019   ondansetron (Zofran ODT) 4 mg disintegrating tablet   No No   Sig: Take 1 tablet (4 mg total) by mouth every 6 (six) hours as needed for nausea or  vomiting   sucralfate (CARAFATE) 1 g tablet   No No   Sig: Take 1 tablet (1 g total) by mouth 4 (four) times a day   Patient not taking: Reported on 5/22/2019   sucralfate (CARAFATE) 1 g tablet   No No   Sig: Take 1 tablet (1 g total) by mouth 4 (four) times a day      Facility-Administered Medications: None       History reviewed. No pertinent past medical history.    Past Surgical History:   Procedure Laterality Date    FOOT SURGERY         History reviewed. No pertinent family history.  I have reviewed and agree with the history as documented.    E-Cigarette/Vaping    E-Cigarette Use Current Every Day User      E-Cigarette/Vaping Substances    Nicotine Yes      Social History     Tobacco Use    Smoking status: Former     Types: E-Cigarettes    Smokeless tobacco: Never   Vaping Use    Vaping status: Every Day    Substances: Nicotine   Substance Use Topics    Alcohol use: No    Drug use: Yes     Types: Marijuana     Comment: occasionaly        Review of Systems   All other systems reviewed and are negative.      Physical Exam  Physical Exam  Vitals and nursing note reviewed.   Constitutional:       General: He is not in acute distress.     Appearance: Normal appearance. He is not ill-appearing, toxic-appearing or diaphoretic.   HENT:      Nose: Nose normal.      Mouth/Throat:      Mouth: Mucous membranes are moist.   Eyes:      Conjunctiva/sclera: Conjunctivae normal.   Cardiovascular:      Rate and Rhythm: Normal rate.   Pulmonary:      Effort: Pulmonary effort is normal.   Musculoskeletal:         General: Tenderness present.      Cervical back: Normal range of motion.      Lumbar back: Spasms and tenderness present. No swelling, edema, deformity, signs of trauma or bony tenderness. Decreased range of motion. Negative right straight leg raise test and negative left straight leg raise test.        Back:    Skin:     General: Skin is warm.      Findings: No rash.   Neurological:      General: No focal deficit  present.      Mental Status: He is alert.      Sensory: Sensation is intact.      Motor: Motor function is intact.      Deep Tendon Reflexes:      Reflex Scores:       Patellar reflexes are 2+ on the right side and 2+ on the left side.  Psychiatric:         Mood and Affect: Mood normal.         Vital Signs  ED Triage Vitals   Temperature Pulse Respirations Blood Pressure SpO2   05/18/24 1029 05/18/24 1029 05/18/24 1029 05/18/24 1029 05/18/24 1029   98 °F (36.7 °C) 84 17 135/60 100 %      Temp Source Heart Rate Source Patient Position - Orthostatic VS BP Location FiO2 (%)   05/18/24 1029 05/18/24 1029 05/18/24 1029 05/18/24 1029 --   Oral Monitor Sitting Right arm       Pain Score       05/18/24 1119       10 - Worst Possible Pain           Vitals:    05/18/24 1029   BP: 135/60   Pulse: 84   Patient Position - Orthostatic VS: Sitting         Visual Acuity      ED Medications  Medications   lidocaine (LIDODERM) 5 % patch 1 patch (1 patch Topical Medication Applied 5/18/24 1119)   ketorolac (TORADOL) injection 30 mg (30 mg Intramuscular Given 5/18/24 1119)       Diagnostic Studies  Results Reviewed       None                   No orders to display              Procedures  Procedures         ED Course                               SBIRT 20yo+      Flowsheet Row Most Recent Value   Initial Alcohol Screen: US AUDIT-C     1. How often do you have a drink containing alcohol? 0 Filed at: 05/18/2024 1029   2. How many drinks containing alcohol do you have on a typical day you are drinking?  0 Filed at: 05/18/2024 1029   3a. Male UNDER 65: How often do you have five or more drinks on one occasion? 0 Filed at: 05/18/2024 1029   Audit-C Score 0 Filed at: 05/18/2024 1029   PHILLIP: How many times in the past year have you...    Used an illegal drug or used a prescription medication for non-medical reasons? Never Filed at: 05/18/2024 1029                      Medical Decision Making  Back pain w no red flags, no hx suggestive of  acute nerve/spinal cord involvement such as cauda equina.  No trauma, no need for xray at this time. Will treat symptomatically and refer to the [facility]    Problems Addressed:  Acute low back pain: acute illness or injury    Amount and/or Complexity of Data Reviewed  External Data Reviewed: notes.     Details: PDMP reviewed    Risk  Prescription drug management.             Disposition  Final diagnoses:   Acute low back pain     Time reflects when diagnosis was documented in both MDM as applicable and the Disposition within this note       Time User Action Codes Description Comment    5/18/2024 11:13 AM Lilia Hoskins Add [M54.50] Acute low back pain           ED Disposition       ED Disposition   Discharge    Condition   Stable    Date/Time   Sat May 18, 2024 1113    Comment   Grover Hinojosa discharge to home/self care.                   Follow-up Information       Follow up With Specialties Details Why Contact Info Additional Information     [facility] Call  if you don't hear from them in the next 72 hours [phone]            Patient's Medications   Discharge Prescriptions    IBUPROFEN (MOTRIN) 600 MG TABLET    Take 1 tablet (600 mg total) by mouth every 6 (six) hours as needed for moderate pain       Start Date: 5/18/2024 End Date: --       Order Dose: 600 mg       Quantity: 30 tablet    Refills: 0    METHOCARBAMOL (ROBAXIN) 750 MG TABLET    Take 1 tablet (750 mg total) by mouth 3 (three) times a day as needed for muscle spasms       Start Date: 5/18/2024 End Date: --       Order Dose: 750 mg       Quantity: 30 tablet    Refills: 0           PDMP Review         Value Time User    PDMP Reviewed  Yes 5/18/2024 11:22 AM Lilia Hoskins DO            ED Provider  Electronically Signed by             Lilia Hoskins DO  05/18/24 1123       Lilia Hoskins DO  05/18/24 1136

## 2024-05-20 ENCOUNTER — TELEPHONE (OUTPATIENT)
Dept: PHYSICAL THERAPY | Facility: OTHER | Age: 29
End: 2024-05-20

## 2024-05-20 NOTE — TELEPHONE ENCOUNTER
Call placed to the patient per [facility] referral.    V/M left in Bulgarian for patient to call back. Phone number and hours of business provided.    This is the 1st attempt to reach the patient per protocol.

## 2024-05-31 NOTE — TELEPHONE ENCOUNTER
Call placed to the patient per [facility] referral.    Voice message left for patient to call back. Phone number and hours of business provided.     Per comp spine protocol will closed referral  I.S#[id number]

## 2024-10-15 ENCOUNTER — HOSPITAL ENCOUNTER (EMERGENCY)
Facility: HOSPITAL | Age: 29
Discharge: HOME | End: 2024-10-15
Attending: EMERGENCY MEDICINE

## 2024-10-15 VITALS
DIASTOLIC BLOOD PRESSURE: 60 MMHG | RESPIRATION RATE: 18 BRPM | TEMPERATURE: 98.3 F | HEART RATE: 74 BPM | OXYGEN SATURATION: 100 % | SYSTOLIC BLOOD PRESSURE: 139 MMHG

## 2024-10-15 DIAGNOSIS — M54.50 ACUTE BILATERAL LOW BACK PAIN WITHOUT SCIATICA: Primary | ICD-10-CM

## 2024-10-15 LAB
ANION GAP SERPL CALCULATED.3IONS-SCNC: 4 MMOL/L (ref 4–13)
BILIRUB UR QL STRIP: NEGATIVE
BUN SERPL-MCNC: 9 MG/DL (ref 5–25)
CALCIUM SERPL-MCNC: 9.9 MG/DL (ref 8.4–10.2)
CHLORIDE SERPL-SCNC: 102 MMOL/L (ref 96–108)
CLARITY UR: CLEAR
CO2 SERPL-SCNC: 33 MMOL/L (ref 21–32)
COLOR UR: YELLOW
CREAT SERPL-MCNC: 0.72 MG/DL (ref 0.6–1.3)
GFR SERPL CREATININE-BSD FRML MDRD: 124 ML/MIN/1.73SQ M
GLUCOSE SERPL-MCNC: 89 MG/DL (ref 65–140)
GLUCOSE UR STRIP-MCNC: NEGATIVE MG/DL
HGB UR QL STRIP.AUTO: NEGATIVE
KETONES UR STRIP-MCNC: NEGATIVE MG/DL
LEUKOCYTE ESTERASE UR QL STRIP: NEGATIVE
NITRITE UR QL STRIP: NEGATIVE
PH UR STRIP.AUTO: 7 [PH] (ref 4.5–8)
POTASSIUM SERPL-SCNC: 4 MMOL/L (ref 3.5–5.3)
PROT UR STRIP-MCNC: NEGATIVE MG/DL
SODIUM SERPL-SCNC: 139 MMOL/L (ref 135–147)
SP GR UR STRIP.AUTO: 1.02 (ref 1–1.03)
UROBILINOGEN UR QL STRIP.AUTO: 0.2 E.U./DL

## 2024-10-15 PROCEDURE — 36415 COLL VENOUS BLD VENIPUNCTURE: CPT

## 2024-10-15 PROCEDURE — 99284 EMERGENCY DEPT VISIT MOD MDM: CPT

## 2024-10-15 PROCEDURE — 99283 EMERGENCY DEPT VISIT LOW MDM: CPT

## 2024-10-15 PROCEDURE — 81003 URINALYSIS AUTO W/O SCOPE: CPT

## 2024-10-15 PROCEDURE — 80048 BASIC METABOLIC PNL TOTAL CA: CPT

## 2024-10-16 NOTE — ED PROVIDER NOTES
"Time reflects when diagnosis was documented in both MDM as applicable and the Disposition within this note       Time User Action Codes Description Comment    10/15/2024  9:23 PM Genesis Walker Add [M54.50] Acute bilateral low back pain without sciatica           ED Disposition       ED Disposition   Discharge    Condition   Stable    Date/Time   Tue Oct 15, 2024  9:23 PM    Comment   Grover Hinojosa discharge to home/self care.                   Assessment & Plan       Medical Decision Making  DDx includes nephrolithiasis, CKD, pyelonephritis, urinary tract infection, muscle spasm, STI    UA shows no blood, leukocytes, nitrites.  BMP shows creatinine 0.72,   Discussed that he does not have a UTI, his kidney function is normal.  Discussed that his back pain is most likely musculoskeletal.  Patient presents for back pain but also notes that he has pain with masturbation.  He was worked up in the ED for this once previously reports that his discomfort is ongoing.  Recommended follow-up with urology.    Discussed findings from the visit with the patient.  We had a conversation regarding supportive care and indications for return.  Recommended appropriate follow-up.  Patient and/or family understand and agree with plan.    Portions of the record may have been created with voice recognition software. Occasional use of the incorrect word or \"sound a like\" substitutions may have occurred due to the inherent limitations of voice recognition software. Read the chart carefully and recognize, using context, where substitutions have occurred.       Amount and/or Complexity of Data Reviewed  Labs: ordered. Decision-making details documented in ED Course.        ED Course as of 10/15/24 2348   Tue Oct 15, 2024   2049 Blood, UA: Negative   2049 Leukocytes, UA: Negative       Medications - No data to display    ED Risk Strat Scores                           SBIRT 20yo+      Flowsheet Row Most Recent Value   Initial " Alcohol Screen: US AUDIT-C     1. How often do you have a drink containing alcohol? 0 Filed at: 10/15/2024 1952   2. How many drinks containing alcohol do you have on a typical day you are drinking?  0 Filed at: 10/15/2024 1952   3a. Male UNDER 65: How often do you have five or more drinks on one occasion? 1 Filed at: 10/15/2024 1952   Audit-C Score 1 Filed at: 10/15/2024 1952   PHILLIP: How many times in the past year have you...    Used an illegal drug or used a prescription medication for non-medical reasons? Never Filed at: 10/15/2024 1952                            History of Present Illness       Chief Complaint   Patient presents with    Back Pain     Pt coming with int lower back pain x2 months, pain worse today after drinking redbull 2 hours ago. No hx of kidney stones. No fever, denies urinary symptoms       History reviewed. No pertinent past medical history.   Past Surgical History:   Procedure Laterality Date    FOOT SURGERY        History reviewed. No pertinent family history.   Social History     Tobacco Use    Smoking status: Former     Types: E-Cigarettes    Smokeless tobacco: Never   Vaping Use    Vaping status: Every Day    Substances: Nicotine   Substance Use Topics    Alcohol use: No    Drug use: Yes     Types: Marijuana     Comment: occasionaly       E-Cigarette/Vaping    E-Cigarette Use Current Every Day User       E-Cigarette/Vaping Substances    Nicotine Yes       I have reviewed and agree with the history as documented.     Patient presents with:  Back Pain: Pt coming with int lower back pain x2 months, pain worse today after drinking redbull 2 hours ago. No hx of kidney stones. No fever, denies urinary symptoms      Grover is a 29-year-old male with history of low back pain presenting to the emergency department for lower back pain x 2 months but reports that it worsened today after drinking a red bull.  He states that this has happened in the past after drinking energy drinks.  He states  that he has family history of kidney disease and is concerned regarding this.  He denies dysuria, hematuria, decreased urinary output.  He reports that this back pain feels different than his musculoskeletal back pain.  He states that this pain is more dull.  Patient requesting kidney testing. he reports that he was previously worked up for pain with masturbation and continues to have this discomfort.  Denies fevers, chills.            Review of Systems   Constitutional:  Negative for chills and fever.   Respiratory:  Negative for cough and shortness of breath.    Cardiovascular:  Negative for chest pain and palpitations.   Gastrointestinal:  Negative for abdominal pain, nausea and vomiting.   Genitourinary:  Negative for dysuria, hematuria, penile swelling and scrotal swelling.   Musculoskeletal:  Positive for back pain. Negative for arthralgias.   Skin:  Negative for color change and rash.   All other systems reviewed and are negative.          Objective       ED Triage Vitals [10/15/24 1951]   Temperature Pulse Blood Pressure Respirations SpO2 Patient Position - Orthostatic VS   98.3 °F (36.8 °C) 74 139/60 18 100 % Sitting      Temp Source Heart Rate Source BP Location FiO2 (%) Pain Score    Oral Monitor Right arm -- --      Vitals      Date and Time Temp Pulse SpO2 Resp BP Pain Score FACES Pain Rating User   10/15/24 1951 98.3 °F (36.8 °C) 74 100 % 18 139/60 -- -- VF            Physical Exam  Vitals and nursing note reviewed.   Constitutional:       General: He is not in acute distress.     Appearance: He is well-developed.   HENT:      Head: Normocephalic and atraumatic.   Eyes:      Conjunctiva/sclera: Conjunctivae normal.   Cardiovascular:      Rate and Rhythm: Normal rate and regular rhythm.      Heart sounds: No murmur heard.  Pulmonary:      Effort: Pulmonary effort is normal. No respiratory distress.      Breath sounds: Normal breath sounds.   Abdominal:      Palpations: Abdomen is soft.      Tenderness:  There is no abdominal tenderness. There is no right CVA tenderness or left CVA tenderness.   Musculoskeletal:         General: No swelling.      Cervical back: Neck supple.        Back:       Comments: No midline pain.  No change in range of motion.   Skin:     General: Skin is warm and dry.      Capillary Refill: Capillary refill takes less than 2 seconds.   Neurological:      Mental Status: He is alert.   Psychiatric:         Mood and Affect: Mood normal.         Results Reviewed       Procedure Component Value Units Date/Time    Basic metabolic panel [[id number]]  (Abnormal) Collected: 10/15/24 2045    Lab Status: Final result Specimen: Blood from Arm, Right Updated: 10/15/24 2118     Sodium 139 mmol/L      Potassium 4.0 mmol/L      Chloride 102 mmol/L      CO2 33 mmol/L      ANION GAP 4 mmol/L      BUN 9 mg/dL      Creatinine 0.72 mg/dL      Glucose 89 mg/dL      Calcium 9.9 mg/dL      eGFR 124 ml/min/1.73sq m     Narrative:      National Kid[employer] guidelines for Chronic Kidney Disease (CKD):     Stage 1 with normal or high GFR (GFR > 90 mL/min/1.73 square meters)    Stage 2 Mild CKD (GFR = 60-89 mL/min/1.73 square meters)    Stage 3A Moderate CKD (GFR = 45-59 mL/min/1.73 square meters)    Stage 3B Moderate CKD (GFR = 30-44 mL/min/1.73 square meters)    Stage 4 Severe CKD (GFR = 15-29 mL/min/1.73 square meters)    Stage 5 End Stage CKD (GFR <15 mL/min/1.73 square meters)  Note: GFR calculation is accurate only with a steady state creatinine    Urine Macroscopic, POC [[id number]] Collected: 10/15/24 2047    Lab Status: Final result Specimen: Urine Updated: 10/15/24 2049     Color, UA Yellow     Clarity, UA Clear     pH, UA 7.0     Leukocytes, UA Negative     Nitrite, UA Negative     Protein, UA Negative mg/dl      Glucose, UA Negative mg/dl      Ketones, UA Negative mg/dl      Urobilinogen, UA 0.2 E.U./dl      Bilirubin, UA Negative     Occult Blood, UA Negative     Specific Gravity, UA 1.020     Narrative:      CLINITEK RESULT            No orders to display       Procedures    ED Medication and Procedure Management   Prior to Admission Medications   Prescriptions Last Dose Informant Patient Reported? Taking?   ibuprofen (MOTRIN) 600 mg tablet   No No   Sig: Take 1 tablet (600 mg total) by mouth every 6 (six) hours as needed for mild pain   ibuprofen (MOTRIN) 600 mg tablet   No No   Sig: Take 1 tablet (600 mg total) by mouth every 6 (six) hours as needed for moderate pain   methocarbamol (ROBAXIN) 750 mg tablet   No No   Sig: Take 1 tablet (750 mg total) by mouth 3 (three) times a day as needed for muscle spasms   omeprazole (PriLOSEC) 20 mg delayed release capsule   No No   Sig: Take 1 capsule (20 mg total) by mouth daily   Patient not taking: Reported on 5/22/2019   omeprazole (PriLOSEC) 20 mg delayed release capsule   No No   Sig: Take 1 capsule (20 mg total) by mouth daily   Patient not taking: Reported on 2/26/2023   ondansetron (ZOFRAN) 4 mg tablet   No No   Sig: Take 1 tablet (4 mg total) by mouth every 8 (eight) hours as needed for nausea or vomiting   Patient not taking: Reported on 2/26/2023   ondansetron (ZOFRAN-ODT) 4 mg disintegrating tablet   No No   Sig: Take 1 tablet (4 mg total) by mouth every 6 (six) hours as needed for nausea or vomiting   Patient not taking: Reported on 5/22/2019   ondansetron (Zofran ODT) 4 mg disintegrating tablet   No No   Sig: Take 1 tablet (4 mg total) by mouth every 6 (six) hours as needed for nausea or vomiting   sucralfate (CARAFATE) 1 g tablet   No No   Sig: Take 1 tablet (1 g total) by mouth 4 (four) times a day   Patient not taking: Reported on 5/22/2019   sucralfate (CARAFATE) 1 g tablet   No No   Sig: Take 1 tablet (1 g total) by mouth 4 (four) times a day      Facility-Administered Medications: None     Discharge Medication List as of 10/15/2024  9:23 PM        CONTINUE these medications which have NOT CHANGED    Details   !! ibuprofen (MOTRIN) 600 mg  tablet Take 1 tablet (600 mg total) by mouth every 6 (six) hours as needed for mild pain, Starting Wed 6/21/2023, Normal      !! ibuprofen (MOTRIN) 600 mg tablet Take 1 tablet (600 mg total) by mouth every 6 (six) hours as needed for moderate pain, Starting Sat 5/18/2024, Normal      methocarbamol (ROBAXIN) 750 mg tablet Take 1 tablet (750 mg total) by mouth 3 (three) times a day as needed for muscle spasms, Starting Sat 5/18/2024, Normal      !! omeprazole (PriLOSEC) 20 mg delayed release capsule Take 1 capsule (20 mg total) by mouth daily, Starting Wed 8/15/2018, Print      !! omeprazole (PriLOSEC) 20 mg delayed release capsule Take 1 capsule (20 mg total) by mouth daily, Starting Thu 9/10/2020, Normal      !! ondansetron (Zofran ODT) 4 mg disintegrating tablet Take 1 tablet (4 mg total) by mouth every 6 (six) hours as needed for nausea or vomiting, Starting Sun 2/26/2023, Normal      ondansetron (ZOFRAN) 4 mg tablet Take 1 tablet (4 mg total) by mouth every 8 (eight) hours as needed for nausea or vomiting, Starting u 9/10/2020, Normal      !! ondansetron (ZOFRAN-ODT) 4 mg disintegrating tablet Take 1 tablet (4 mg total) by mouth every 6 (six) hours as needed for nausea or vomiting, Starting Tue 8/14/2018, Print      !! sucralfate (CARAFATE) 1 g tablet Take 1 tablet (1 g total) by mouth 4 (four) times a day, Starting Wed 8/15/2018, Print      !! sucralfate (CARAFATE) 1 g tablet Take 1 tablet (1 g total) by mouth 4 (four) times a day, Starting Sun 2/26/2023, Normal       !! - Potential duplicate medications found. Please discuss with provider.        No discharge procedures on file.  ED SEPSIS DOCUMENTATION   Time reflects when diagnosis was documented in both MDM as applicable and the Disposition within this note       Time User Action Codes Description Comment    10/15/2024  9:23 PM Genesis Walker Add [M54.50] Acute bilateral low back pain without sciatica                  Genesis Walker PA-C  10/15/24  9459

## 2025-05-04 ENCOUNTER — HOSPITAL ENCOUNTER (EMERGENCY)
Facility: HOSPITAL | Age: 30
Discharge: HOME | End: 2025-05-04
Attending: EMERGENCY MEDICINE

## 2025-05-04 VITALS
TEMPERATURE: 98.5 F | DIASTOLIC BLOOD PRESSURE: 59 MMHG | OXYGEN SATURATION: 99 % | WEIGHT: 211.2 LBS | HEART RATE: 71 BPM | SYSTOLIC BLOOD PRESSURE: 134 MMHG | RESPIRATION RATE: 16 BRPM

## 2025-05-04 DIAGNOSIS — M54.50 BILATERAL LOW BACK PAIN: Primary | ICD-10-CM

## 2025-05-04 LAB
BILIRUB UR QL STRIP: NEGATIVE
CLARITY UR: CLEAR
COLOR UR: YELLOW
GLUCOSE UR STRIP-MCNC: NEGATIVE MG/DL
HGB UR QL STRIP.AUTO: NEGATIVE
KETONES UR STRIP-MCNC: NEGATIVE MG/DL
LEUKOCYTE ESTERASE UR QL STRIP: NEGATIVE
NITRITE UR QL STRIP: NEGATIVE
PH UR STRIP.AUTO: 7.5 [PH] (ref 4.5–8)
PROT UR STRIP-MCNC: NEGATIVE MG/DL
SP GR UR STRIP.AUTO: 1.01 (ref 1–1.03)
UROBILINOGEN UR QL STRIP.AUTO: 0.2 E.U./DL

## 2025-05-04 PROCEDURE — 81003 URINALYSIS AUTO W/O SCOPE: CPT

## 2025-05-04 PROCEDURE — 99284 EMERGENCY DEPT VISIT MOD MDM: CPT

## 2025-05-04 PROCEDURE — 99282 EMERGENCY DEPT VISIT SF MDM: CPT

## 2025-05-04 RX ORDER — NAPROXEN 250 MG/1
500 TABLET ORAL ONCE
Status: COMPLETED | OUTPATIENT
Start: 2025-05-04 | End: 2025-05-04

## 2025-05-04 RX ORDER — NAPROXEN 500 MG/1
500 TABLET ORAL 2 TIMES DAILY WITH MEALS
Qty: 30 TABLET | Refills: 0 | Status: SHIPPED | OUTPATIENT
Start: 2025-05-04

## 2025-05-04 RX ORDER — METHOCARBAMOL 500 MG/1
500 TABLET, FILM COATED ORAL 2 TIMES DAILY
Qty: 20 TABLET | Refills: 0 | Status: SHIPPED | OUTPATIENT
Start: 2025-05-04

## 2025-05-04 RX ADMIN — NAPROXEN 500 MG: 250 TABLET ORAL at 11:20

## 2025-05-04 NOTE — ED PROVIDER NOTES
Time reflects when diagnosis was documented in both MDM as applicable and the Disposition within this note       Time User Action Codes Description Comment    5/4/2025 11:27 AM Judie Parikh Add [M54.50] Bilateral low back pain           ED Disposition       ED Disposition   Discharge    Condition   Stable    Date/Time   Sun May 4, 2025 11:27 AM    Comment   Grover Hinojosa discharge to home/self care.                   Assessment & Plan       Medical Decision Making  Patient is a 30-year-old male presenting with low back pain since yesterday.  He has no other associated symptoms.  Vitals within normal limits on arrival and is no acute distress.  DDx: UTI, ureteral calculi, musculoskeletal.  No red flag symptoms concerning for cauda equina/epidural abscess-no imaging indicated.  Will obtain UA and treat symptomatically with naproxen.    UA negative, low suspicion for UTI or stone. Will discharge with symptomatic management for likely musculoskeletal etiology, prescribed naprosyn and robaxin. Referral placed for comprehensive spine program for follow up.    I have discussed findings and plan for discharge with the patient/caregiver. Follow up with the appropriate providers including primary care physician was discussed. Return precautions discussed with patient/caregiver as outlined in AVS. Patient/caregiver verbally expressed understanding. Patient stable at time of discharge and ambulated out of the emergency department.    Risk  Prescription drug management.             Medications   naproxen (NAPROSYN) tablet 500 mg (500 mg Oral Given 5/4/25 1120)       ED Risk Strat Scores                    No data recorded                            History of Present Illness       Chief Complaint   Patient presents with    Back Pain     L lower back pain beginning yesterday. Denies urinary symptoms, n/v/d/c or injury.        History reviewed. No pertinent past medical history.   Past Surgical History:   Procedure  Laterality Date    FOOT SURGERY        History reviewed. No pertinent family history.   Social History     Tobacco Use    Smoking status: Former     Types: E-Cigarettes    Smokeless tobacco: Never   Vaping Use    Vaping status: Every Day    Substances: Nicotine   Substance Use Topics    Alcohol use: No    Drug use: Yes     Types: Marijuana     Comment: occasionaly       E-Cigarette/Vaping    E-Cigarette Use Current Every Day User       E-Cigarette/Vaping Substances    Nicotine Yes       I have reviewed and agree with the history as documented.     Patient is a 30-year-old male without significant past medical history presenting for evaluation of low back pain since yesterday.  Pain is bilateral but sometimes worse on the left.  Describes pain as a pressure.  It does not radiate into the abdomen or legs.  No weakness or numbness distally. He does not have associated dysuria, hematuria, frequency.  No saddle anesthesia, bowel or bladder dysfunction.  Taking methocarbamol at home with some relief.  No injury to the back.  He was lifting heavy boxes at work today and noted that this worsened symptoms.      Back Pain  Associated symptoms: no abdominal pain, no chest pain, no dysuria, no fever and no headaches        Review of Systems   Constitutional:  Negative for chills and fever.   HENT:  Negative for congestion, ear pain and sore throat.    Eyes:  Negative for pain and visual disturbance.   Respiratory:  Negative for cough and shortness of breath.    Cardiovascular:  Negative for chest pain and leg swelling.   Gastrointestinal:  Negative for abdominal pain, diarrhea, nausea and vomiting.   Genitourinary:  Negative for dysuria and flank pain.   Musculoskeletal:  Positive for back pain. Negative for neck pain.   Skin:  Negative for rash.   Neurological:  Negative for dizziness and headaches.   Psychiatric/Behavioral:  Negative for confusion.            Objective       ED Triage Vitals [05/04/25 1044]   Temperature  Pulse Blood Pressure Respirations SpO2 Patient Position - Orthostatic VS   98.5 °F (36.9 °C) 71 134/59 16 99 % --      Temp Source Heart Rate Source BP Location FiO2 (%) Pain Score    Oral -- -- -- --      Vitals      Date and Time Temp Pulse SpO2 Resp BP Pain Score FACES Pain Rating User   05/04/25 1044 98.5 °F (36.9 °C) 71 99 % 16 134/59 -- -- JL            Physical Exam  Vitals and nursing note reviewed.   Constitutional:       General: He is not in acute distress.     Appearance: Normal appearance.   HENT:      Head: Normocephalic and atraumatic.      Right Ear: External ear normal.      Left Ear: External ear normal.      Nose: Nose normal.   Eyes:      General: No scleral icterus.        Right eye: No discharge.         Left eye: No discharge.   Cardiovascular:      Rate and Rhythm: Normal rate and regular rhythm.      Pulses: Normal pulses.      Heart sounds: Normal heart sounds.   Pulmonary:      Effort: Pulmonary effort is normal. No respiratory distress.      Breath sounds: Normal breath sounds.   Abdominal:      Palpations: Abdomen is soft.      Tenderness: There is no abdominal tenderness. There is no right CVA tenderness or left CVA tenderness.   Musculoskeletal:         General: No tenderness, deformity or signs of injury.      Cervical back: Normal, normal range of motion and neck supple.      Thoracic back: Normal.      Lumbar back: Normal. No tenderness or bony tenderness.      Comments: No tenderness to bilateral lumbar paraspinal muscles. No midline spine tenderness, step offs, deformities. Strength, sensation equal and intact in bilateral lower extremities. DP and PT pulses 2+ bilaterally.     Skin:     General: Skin is dry.      Coloration: Skin is not jaundiced.      Findings: No erythema or rash.   Neurological:      General: No focal deficit present.      Mental Status: He is alert and oriented to person, place, and time. Mental status is at baseline.      Motor: No weakness.      Gait: Gait  normal.   Psychiatric:         Mood and Affect: Mood normal.         Behavior: Behavior normal.         Thought Content: Thought content normal.         Results Reviewed       Procedure Component Value Units Date/Time    Urine Macroscopic, POC [[id number]] Collected: 05/04/25 1122    Lab Status: Final result Specimen: Urine Updated: 05/04/25 1123     Color, UA Yellow     Clarity, UA Clear     pH, UA 7.5     Leukocytes, UA Negative     Nitrite, UA Negative     Protein, UA Negative mg/dl      Glucose, UA Negative mg/dl      Ketones, UA Negative mg/dl      Urobilinogen, UA 0.2 E.U./dl      Bilirubin, UA Negative     Occult Blood, UA Negative     Specific Gravity, UA 1.015    Narrative:      CLINITEK RESULT            No orders to display       Procedures    ED Medication and Procedure Management   Prior to Admission Medications   Prescriptions Last Dose Informant Patient Reported? Taking?   ibuprofen (MOTRIN) 600 mg tablet   No No   Sig: Take 1 tablet (600 mg total) by mouth every 6 (six) hours as needed for mild pain   ibuprofen (MOTRIN) 600 mg tablet   No No   Sig: Take 1 tablet (600 mg total) by mouth every 6 (six) hours as needed for moderate pain   methocarbamol (ROBAXIN) 750 mg tablet   No No   Sig: Take 1 tablet (750 mg total) by mouth 3 (three) times a day as needed for muscle spasms   omeprazole (PriLOSEC) 20 mg delayed release capsule   No No   Sig: Take 1 capsule (20 mg total) by mouth daily   Patient not taking: Reported on 5/22/2019   omeprazole (PriLOSEC) 20 mg delayed release capsule   No No   Sig: Take 1 capsule (20 mg total) by mouth daily   Patient not taking: Reported on 2/26/2023   ondansetron (ZOFRAN) 4 mg tablet   No No   Sig: Take 1 tablet (4 mg total) by mouth every 8 (eight) hours as needed for nausea or vomiting   Patient not taking: Reported on 2/26/2023   ondansetron (ZOFRAN-ODT) 4 mg disintegrating tablet   No No   Sig: Take 1 tablet (4 mg total) by mouth every 6 (six) hours as needed for  nausea or vomiting   Patient not taking: Reported on 5/22/2019   ondansetron (Zofran ODT) 4 mg disintegrating tablet   No No   Sig: Take 1 tablet (4 mg total) by mouth every 6 (six) hours as needed for nausea or vomiting   sucralfate (CARAFATE) 1 g tablet   No No   Sig: Take 1 tablet (1 g total) by mouth 4 (four) times a day   Patient not taking: Reported on 5/22/2019   sucralfate (CARAFATE) 1 g tablet   No No   Sig: Take 1 tablet (1 g total) by mouth 4 (four) times a day      Facility-Administered Medications: None     Discharge Medication List as of 5/4/2025 11:30 AM        START taking these medications    Details   !! methocarbamol (ROBAXIN) 500 mg tablet Take 1 tablet (500 mg total) by mouth 2 (two) times a day, Starting Sun 5/4/2025, Normal      naproxen (Naprosyn) 500 mg tablet Take 1 tablet (500 mg total) by mouth 2 (two) times a day with meals, Starting Sun 5/4/2025, Normal       !! - Potential duplicate medications found. Please discuss with provider.        CONTINUE these medications which have NOT CHANGED    Details   !! ibuprofen (MOTRIN) 600 mg tablet Take 1 tablet (600 mg total) by mouth every 6 (six) hours as needed for mild pain, Starting Wed 6/21/2023, Normal      !! ibuprofen (MOTRIN) 600 mg tablet Take 1 tablet (600 mg total) by mouth every 6 (six) hours as needed for moderate pain, Starting Sat 5/18/2024, Normal      !! methocarbamol (ROBAXIN) 750 mg tablet Take 1 tablet (750 mg total) by mouth 3 (three) times a day as needed for muscle spasms, Starting Sat 5/18/2024, Normal      !! omeprazole (PriLOSEC) 20 mg delayed release capsule Take 1 capsule (20 mg total) by mouth daily, Starting Wed 8/15/2018, Print      !! omeprazole (PriLOSEC) 20 mg delayed release capsule Take 1 capsule (20 mg total) by mouth daily, Starting Thu 9/10/2020, Normal      !! ondansetron (Zofran ODT) 4 mg disintegrating tablet Take 1 tablet (4 mg total) by mouth every 6 (six) hours as needed for nausea or vomiting,  Starting Sun 2/26/2023, Normal      ondansetron (ZOFRAN) 4 mg tablet Take 1 tablet (4 mg total) by mouth every 8 (eight) hours as needed for nausea or vomiting, Starting Thu 9/10/2020, Normal      !! ondansetron (ZOFRAN-ODT) 4 mg disintegrating tablet Take 1 tablet (4 mg total) by mouth every 6 (six) hours as needed for nausea or vomiting, Starting Tue 8/14/2018, Print      !! sucralfate (CARAFATE) 1 g tablet Take 1 tablet (1 g total) by mouth 4 (four) times a day, Starting Wed 8/15/2018, Print      !! sucralfate (CARAFATE) 1 g tablet Take 1 tablet (1 g total) by mouth 4 (four) times a day, Starting Sun 2/26/2023, Normal       !! - Potential duplicate medications found. Please discuss with provider.          ED SEPSIS DOCUMENTATION   Time reflects when diagnosis was documented in both MDM as applicable and the Disposition within this note       Time User Action Codes Description Comment    5/4/2025 11:27 AM Judie Parikh Add [M54.50] Bilateral low back pain                  Judie Parikh PA-C  05/04/25 1208

## 2025-05-04 NOTE — DISCHARGE INSTRUCTIONS
Your urine today was normal. Please take robaxin and naprosyn as needed for pain. Follow up with comprehensive spine program.

## 2025-05-04 NOTE — Clinical Note
Grover SuttoncameronRuben was seen and treated in our emergency department on 5/4/2025.                Diagnosis:     Grover  may return to work on return date.    He may return on this date: 05/05/2025         If you have any questions or concerns, please don't hesitate to call.      Judie Parikh PA-C    ______________________________           _______________          _______________  Hospital Representative                              Date                                Time

## 2025-05-05 ENCOUNTER — TELEPHONE (OUTPATIENT)
Dept: PHYSICAL THERAPY | Facility: OTHER | Age: 30
End: 2025-05-05

## 2025-05-05 NOTE — TELEPHONE ENCOUNTER
Call placed to the patient per [facility] referral.     V/M left in [place] for patient to call back. Phone number and hours of business provided.     This is the 1st attempt to reach the patient per protocol.    H.I???